# Patient Record
Sex: MALE | Race: WHITE | NOT HISPANIC OR LATINO | Employment: FULL TIME | URBAN - METROPOLITAN AREA
[De-identification: names, ages, dates, MRNs, and addresses within clinical notes are randomized per-mention and may not be internally consistent; named-entity substitution may affect disease eponyms.]

---

## 2019-07-31 ENCOUNTER — APPOINTMENT (OUTPATIENT)
Dept: LAB | Facility: CLINIC | Age: 54
End: 2019-07-31
Payer: COMMERCIAL

## 2019-07-31 ENCOUNTER — TRANSCRIBE ORDERS (OUTPATIENT)
Dept: LAB | Facility: CLINIC | Age: 54
End: 2019-07-31

## 2019-07-31 DIAGNOSIS — D64.9 ANEMIA, UNSPECIFIED TYPE: ICD-10-CM

## 2019-07-31 DIAGNOSIS — R73.01 IMPAIRED FASTING GLUCOSE: ICD-10-CM

## 2019-07-31 DIAGNOSIS — Z12.11 SPECIAL SCREENING FOR MALIGNANT NEOPLASMS, COLON: ICD-10-CM

## 2019-07-31 DIAGNOSIS — E03.9 MYXEDEMA HEART DISEASE: ICD-10-CM

## 2019-07-31 DIAGNOSIS — E78.5 HYPERLIPIDEMIA, UNSPECIFIED HYPERLIPIDEMIA TYPE: ICD-10-CM

## 2019-07-31 DIAGNOSIS — I51.9 MYXEDEMA HEART DISEASE: ICD-10-CM

## 2019-07-31 DIAGNOSIS — R97.20 ELEVATED PROSTATE SPECIFIC ANTIGEN (PSA): ICD-10-CM

## 2019-07-31 DIAGNOSIS — R73.01 IMPAIRED FASTING GLUCOSE: Primary | ICD-10-CM

## 2019-07-31 LAB
ALBUMIN SERPL BCP-MCNC: 3.7 G/DL (ref 3.5–5)
ALP SERPL-CCNC: 53 U/L (ref 46–116)
ALT SERPL W P-5'-P-CCNC: 31 U/L (ref 12–78)
ANION GAP SERPL CALCULATED.3IONS-SCNC: 5 MMOL/L (ref 4–13)
AST SERPL W P-5'-P-CCNC: 15 U/L (ref 5–45)
BASOPHILS # BLD AUTO: 0.05 THOUSANDS/ΜL (ref 0–0.1)
BASOPHILS NFR BLD AUTO: 1 % (ref 0–1)
BILIRUB SERPL-MCNC: 0.6 MG/DL (ref 0.2–1)
BUN SERPL-MCNC: 13 MG/DL (ref 5–25)
CALCIUM SERPL-MCNC: 8.5 MG/DL (ref 8.3–10.1)
CHLORIDE SERPL-SCNC: 105 MMOL/L (ref 100–108)
CHOLEST SERPL-MCNC: 206 MG/DL (ref 50–200)
CO2 SERPL-SCNC: 27 MMOL/L (ref 21–32)
CREAT SERPL-MCNC: 1.01 MG/DL (ref 0.6–1.3)
EOSINOPHIL # BLD AUTO: 0.48 THOUSAND/ΜL (ref 0–0.61)
EOSINOPHIL NFR BLD AUTO: 7 % (ref 0–6)
ERYTHROCYTE [DISTWIDTH] IN BLOOD BY AUTOMATED COUNT: 13.9 % (ref 11.6–15.1)
ERYTHROCYTE [SEDIMENTATION RATE] IN BLOOD: 5 MM/HOUR (ref 0–10)
GFR SERPL CREATININE-BSD FRML MDRD: 85 ML/MIN/1.73SQ M
GLUCOSE P FAST SERPL-MCNC: 95 MG/DL (ref 65–99)
HCT VFR BLD AUTO: 46.5 % (ref 36.5–49.3)
HDLC SERPL-MCNC: 40 MG/DL (ref 40–60)
HGB BLD-MCNC: 15 G/DL (ref 12–17)
IMM GRANULOCYTES # BLD AUTO: 0.01 THOUSAND/UL (ref 0–0.2)
IMM GRANULOCYTES NFR BLD AUTO: 0 % (ref 0–2)
LDLC SERPL CALC-MCNC: 139 MG/DL (ref 0–100)
LYMPHOCYTES # BLD AUTO: 1.78 THOUSANDS/ΜL (ref 0.6–4.47)
LYMPHOCYTES NFR BLD AUTO: 26 % (ref 14–44)
MCH RBC QN AUTO: 28.7 PG (ref 26.8–34.3)
MCHC RBC AUTO-ENTMCNC: 32.3 G/DL (ref 31.4–37.4)
MCV RBC AUTO: 89 FL (ref 82–98)
MONOCYTES # BLD AUTO: 0.57 THOUSAND/ΜL (ref 0.17–1.22)
MONOCYTES NFR BLD AUTO: 8 % (ref 4–12)
NEUTROPHILS # BLD AUTO: 3.91 THOUSANDS/ΜL (ref 1.85–7.62)
NEUTS SEG NFR BLD AUTO: 58 % (ref 43–75)
NONHDLC SERPL-MCNC: 166 MG/DL
NRBC BLD AUTO-RTO: 0 /100 WBCS
PLATELET # BLD AUTO: 288 THOUSANDS/UL (ref 149–390)
PMV BLD AUTO: 10.5 FL (ref 8.9–12.7)
POTASSIUM SERPL-SCNC: 4.2 MMOL/L (ref 3.5–5.3)
PROT SERPL-MCNC: 7.4 G/DL (ref 6.4–8.2)
RBC # BLD AUTO: 5.22 MILLION/UL (ref 3.88–5.62)
SODIUM SERPL-SCNC: 137 MMOL/L (ref 136–145)
TRIGL SERPL-MCNC: 133 MG/DL
TSH SERPL DL<=0.05 MIU/L-ACNC: 1.34 UIU/ML (ref 0.36–3.74)
URATE SERPL-MCNC: 5.8 MG/DL (ref 4.2–8)
WBC # BLD AUTO: 6.8 THOUSAND/UL (ref 4.31–10.16)

## 2019-07-31 PROCEDURE — 80053 COMPREHEN METABOLIC PANEL: CPT

## 2019-07-31 PROCEDURE — 84443 ASSAY THYROID STIM HORMONE: CPT

## 2019-07-31 PROCEDURE — 86430 RHEUMATOID FACTOR TEST QUAL: CPT

## 2019-07-31 PROCEDURE — 84154 ASSAY OF PSA FREE: CPT

## 2019-07-31 PROCEDURE — 84153 ASSAY OF PSA TOTAL: CPT

## 2019-07-31 PROCEDURE — 86618 LYME DISEASE ANTIBODY: CPT

## 2019-07-31 PROCEDURE — 86200 CCP ANTIBODY: CPT

## 2019-07-31 PROCEDURE — 86038 ANTINUCLEAR ANTIBODIES: CPT

## 2019-07-31 PROCEDURE — 80061 LIPID PANEL: CPT

## 2019-07-31 PROCEDURE — 85652 RBC SED RATE AUTOMATED: CPT

## 2019-07-31 PROCEDURE — 85025 COMPLETE CBC W/AUTO DIFF WBC: CPT

## 2019-07-31 PROCEDURE — 84550 ASSAY OF BLOOD/URIC ACID: CPT

## 2019-07-31 PROCEDURE — 36415 COLL VENOUS BLD VENIPUNCTURE: CPT

## 2019-08-01 LAB
PSA FREE MFR SERPL: 54.4 %
PSA FREE SERPL-MCNC: 0.49 NG/ML
PSA SERPL-MCNC: 0.9 NG/ML (ref 0–4)
RHEUMATOID FACT SER QL LA: NEGATIVE

## 2019-08-02 LAB
B BURGDOR IGG SER IA-ACNC: 0.15
B BURGDOR IGM SER IA-ACNC: 0.25
CCP IGA+IGG SERPL IA-ACNC: 3 UNITS (ref 0–19)
RYE IGE QN: NEGATIVE

## 2021-01-20 ENCOUNTER — TRANSCRIBE ORDERS (OUTPATIENT)
Dept: ADMINISTRATIVE | Facility: HOSPITAL | Age: 56
End: 2021-01-20

## 2021-01-20 DIAGNOSIS — I10 SYSTEMIC PRIMARY ARTERIAL HYPERTENSION: ICD-10-CM

## 2021-01-20 DIAGNOSIS — E55.9 VITAMIN D DEFICIENCY DISEASE: ICD-10-CM

## 2021-01-20 DIAGNOSIS — E78.5 HYPERLIPIDEMIA, UNSPECIFIED HYPERLIPIDEMIA TYPE: ICD-10-CM

## 2021-01-20 DIAGNOSIS — D64.9 ANEMIA, UNSPECIFIED TYPE: ICD-10-CM

## 2021-01-20 DIAGNOSIS — I15.9 SECONDARY HYPERTENSION: Primary | ICD-10-CM

## 2021-01-22 ENCOUNTER — LAB (OUTPATIENT)
Dept: LAB | Facility: HOSPITAL | Age: 56
End: 2021-01-22
Payer: COMMERCIAL

## 2021-01-22 DIAGNOSIS — E78.5 HYPERLIPIDEMIA, UNSPECIFIED HYPERLIPIDEMIA TYPE: ICD-10-CM

## 2021-01-22 DIAGNOSIS — E55.9 VITAMIN D DEFICIENCY DISEASE: ICD-10-CM

## 2021-01-22 DIAGNOSIS — I10 SYSTEMIC PRIMARY ARTERIAL HYPERTENSION: ICD-10-CM

## 2021-01-22 DIAGNOSIS — D64.9 ANEMIA, UNSPECIFIED TYPE: ICD-10-CM

## 2021-01-22 LAB
25(OH)D3 SERPL-MCNC: 18.1 NG/ML (ref 30–100)
ALBUMIN SERPL BCP-MCNC: 4.1 G/DL (ref 3.5–5)
ALP SERPL-CCNC: 61 U/L (ref 46–116)
ALT SERPL W P-5'-P-CCNC: 36 U/L (ref 12–78)
ANION GAP SERPL CALCULATED.3IONS-SCNC: 6 MMOL/L (ref 4–13)
AST SERPL W P-5'-P-CCNC: 26 U/L (ref 5–45)
BASOPHILS # BLD AUTO: 0.06 THOUSANDS/ΜL (ref 0–0.1)
BASOPHILS NFR BLD AUTO: 1 % (ref 0–1)
BILIRUB SERPL-MCNC: 0.6 MG/DL (ref 0.2–1)
BUN SERPL-MCNC: 13 MG/DL (ref 5–25)
CALCIUM SERPL-MCNC: 9.1 MG/DL (ref 8.3–10.1)
CHLORIDE SERPL-SCNC: 105 MMOL/L (ref 100–108)
CHOLEST SERPL-MCNC: 226 MG/DL (ref 50–200)
CO2 SERPL-SCNC: 28 MMOL/L (ref 21–32)
CREAT SERPL-MCNC: 0.91 MG/DL (ref 0.6–1.3)
EOSINOPHIL # BLD AUTO: 0.51 THOUSAND/ΜL (ref 0–0.61)
EOSINOPHIL NFR BLD AUTO: 7 % (ref 0–6)
ERYTHROCYTE [DISTWIDTH] IN BLOOD BY AUTOMATED COUNT: 13.1 % (ref 11.6–15.1)
GFR SERPL CREATININE-BSD FRML MDRD: 95 ML/MIN/1.73SQ M
GLUCOSE P FAST SERPL-MCNC: 93 MG/DL (ref 65–99)
HCT VFR BLD AUTO: 45 % (ref 36.5–49.3)
HDLC SERPL-MCNC: 48 MG/DL
HGB BLD-MCNC: 14.7 G/DL (ref 12–17)
IMM GRANULOCYTES # BLD AUTO: 0.03 THOUSAND/UL (ref 0–0.2)
IMM GRANULOCYTES NFR BLD AUTO: 0 % (ref 0–2)
LDLC SERPL CALC-MCNC: 159 MG/DL (ref 0–100)
LYMPHOCYTES # BLD AUTO: 1.84 THOUSANDS/ΜL (ref 0.6–4.47)
LYMPHOCYTES NFR BLD AUTO: 25 % (ref 14–44)
MCH RBC QN AUTO: 29.9 PG (ref 26.8–34.3)
MCHC RBC AUTO-ENTMCNC: 32.7 G/DL (ref 31.4–37.4)
MCV RBC AUTO: 92 FL (ref 82–98)
MONOCYTES # BLD AUTO: 0.92 THOUSAND/ΜL (ref 0.17–1.22)
MONOCYTES NFR BLD AUTO: 12 % (ref 4–12)
NEUTROPHILS # BLD AUTO: 4.12 THOUSANDS/ΜL (ref 1.85–7.62)
NEUTS SEG NFR BLD AUTO: 55 % (ref 43–75)
NONHDLC SERPL-MCNC: 178 MG/DL
NRBC BLD AUTO-RTO: 0 /100 WBCS
PLATELET # BLD AUTO: 394 THOUSANDS/UL (ref 149–390)
PMV BLD AUTO: 9.7 FL (ref 8.9–12.7)
POTASSIUM SERPL-SCNC: 4 MMOL/L (ref 3.5–5.3)
PROT SERPL-MCNC: 7.7 G/DL (ref 6.4–8.2)
RBC # BLD AUTO: 4.91 MILLION/UL (ref 3.88–5.62)
SODIUM SERPL-SCNC: 139 MMOL/L (ref 136–145)
TRIGL SERPL-MCNC: 95 MG/DL
WBC # BLD AUTO: 7.48 THOUSAND/UL (ref 4.31–10.16)

## 2021-01-22 PROCEDURE — 80061 LIPID PANEL: CPT

## 2021-01-22 PROCEDURE — 82306 VITAMIN D 25 HYDROXY: CPT

## 2021-01-22 PROCEDURE — 80053 COMPREHEN METABOLIC PANEL: CPT

## 2021-01-22 PROCEDURE — 36415 COLL VENOUS BLD VENIPUNCTURE: CPT

## 2021-01-22 PROCEDURE — 85025 COMPLETE CBC W/AUTO DIFF WBC: CPT

## 2021-12-21 ENCOUNTER — APPOINTMENT (OUTPATIENT)
Dept: LAB | Facility: CLINIC | Age: 56
End: 2021-12-21
Payer: COMMERCIAL

## 2021-12-21 DIAGNOSIS — E78.00 PURE HYPERCHOLESTEROLEMIA: ICD-10-CM

## 2021-12-21 DIAGNOSIS — D64.9 ANEMIA, UNSPECIFIED TYPE: ICD-10-CM

## 2021-12-21 DIAGNOSIS — N40.0 BENIGN PROSTATIC HYPERPLASIA WITHOUT LOWER URINARY TRACT SYMPTOMS: ICD-10-CM

## 2021-12-21 DIAGNOSIS — E53.9 VITAMIN B-COMPLEX DEFICIENCY: ICD-10-CM

## 2021-12-21 DIAGNOSIS — I10 ESSENTIAL HYPERTENSION, MALIGNANT: ICD-10-CM

## 2021-12-21 DIAGNOSIS — I51.9 MYXEDEMA HEART DISEASE: ICD-10-CM

## 2021-12-21 DIAGNOSIS — E55.9 AVITAMINOSIS D: ICD-10-CM

## 2021-12-21 DIAGNOSIS — E03.9 MYXEDEMA HEART DISEASE: ICD-10-CM

## 2021-12-21 LAB
25(OH)D3 SERPL-MCNC: 40.1 NG/ML (ref 30–100)
ALBUMIN SERPL BCP-MCNC: 4 G/DL (ref 3.5–5)
ALP SERPL-CCNC: 54 U/L (ref 46–116)
ALT SERPL W P-5'-P-CCNC: 32 U/L (ref 12–78)
ANION GAP SERPL CALCULATED.3IONS-SCNC: 7 MMOL/L (ref 4–13)
AST SERPL W P-5'-P-CCNC: 16 U/L (ref 5–45)
BASOPHILS # BLD AUTO: 0.06 THOUSANDS/ΜL (ref 0–0.1)
BASOPHILS NFR BLD AUTO: 1 % (ref 0–1)
BILIRUB SERPL-MCNC: 1.02 MG/DL (ref 0.2–1)
BUN SERPL-MCNC: 16 MG/DL (ref 5–25)
CALCIUM SERPL-MCNC: 9.9 MG/DL (ref 8.3–10.1)
CHLORIDE SERPL-SCNC: 103 MMOL/L (ref 100–108)
CHOLEST SERPL-MCNC: 198 MG/DL
CO2 SERPL-SCNC: 27 MMOL/L (ref 21–32)
CREAT SERPL-MCNC: 1.05 MG/DL (ref 0.6–1.3)
EOSINOPHIL # BLD AUTO: 0.53 THOUSAND/ΜL (ref 0–0.61)
EOSINOPHIL NFR BLD AUTO: 7 % (ref 0–6)
ERYTHROCYTE [DISTWIDTH] IN BLOOD BY AUTOMATED COUNT: 12.9 % (ref 11.6–15.1)
GFR SERPL CREATININE-BSD FRML MDRD: 78 ML/MIN/1.73SQ M
GLUCOSE P FAST SERPL-MCNC: 89 MG/DL (ref 65–99)
HCT VFR BLD AUTO: 46 % (ref 36.5–49.3)
HDLC SERPL-MCNC: 45 MG/DL
HGB BLD-MCNC: 14.9 G/DL (ref 12–17)
IMM GRANULOCYTES # BLD AUTO: 0.01 THOUSAND/UL (ref 0–0.2)
IMM GRANULOCYTES NFR BLD AUTO: 0 % (ref 0–2)
LDLC SERPL CALC-MCNC: 127 MG/DL (ref 0–100)
LYMPHOCYTES # BLD AUTO: 2.16 THOUSANDS/ΜL (ref 0.6–4.47)
LYMPHOCYTES NFR BLD AUTO: 29 % (ref 14–44)
MCH RBC QN AUTO: 29.2 PG (ref 26.8–34.3)
MCHC RBC AUTO-ENTMCNC: 32.4 G/DL (ref 31.4–37.4)
MCV RBC AUTO: 90 FL (ref 82–98)
MONOCYTES # BLD AUTO: 0.9 THOUSAND/ΜL (ref 0.17–1.22)
MONOCYTES NFR BLD AUTO: 12 % (ref 4–12)
NEUTROPHILS # BLD AUTO: 3.79 THOUSANDS/ΜL (ref 1.85–7.62)
NEUTS SEG NFR BLD AUTO: 51 % (ref 43–75)
NONHDLC SERPL-MCNC: 153 MG/DL
NRBC BLD AUTO-RTO: 0 /100 WBCS
PLATELET # BLD AUTO: 390 THOUSANDS/UL (ref 149–390)
PMV BLD AUTO: 10 FL (ref 8.9–12.7)
POTASSIUM SERPL-SCNC: 4 MMOL/L (ref 3.5–5.3)
PROT SERPL-MCNC: 7.6 G/DL (ref 6.4–8.2)
PSA SERPL-MCNC: 0.6 NG/ML (ref 0–4)
RBC # BLD AUTO: 5.1 MILLION/UL (ref 3.88–5.62)
SODIUM SERPL-SCNC: 137 MMOL/L (ref 136–145)
TRIGL SERPL-MCNC: 129 MG/DL
TSH SERPL DL<=0.05 MIU/L-ACNC: 2.7 UIU/ML (ref 0.36–3.74)
VIT B12 SERPL-MCNC: 1724 PG/ML (ref 100–900)
WBC # BLD AUTO: 7.45 THOUSAND/UL (ref 4.31–10.16)

## 2021-12-21 PROCEDURE — 82306 VITAMIN D 25 HYDROXY: CPT

## 2021-12-21 PROCEDURE — 80053 COMPREHEN METABOLIC PANEL: CPT

## 2021-12-21 PROCEDURE — 82607 VITAMIN B-12: CPT

## 2021-12-21 PROCEDURE — 80061 LIPID PANEL: CPT

## 2021-12-21 PROCEDURE — 84153 ASSAY OF PSA TOTAL: CPT

## 2021-12-21 PROCEDURE — 85025 COMPLETE CBC W/AUTO DIFF WBC: CPT

## 2021-12-21 PROCEDURE — 84443 ASSAY THYROID STIM HORMONE: CPT

## 2021-12-21 PROCEDURE — 36415 COLL VENOUS BLD VENIPUNCTURE: CPT

## 2022-08-18 ENCOUNTER — LAB REQUISITION (OUTPATIENT)
Dept: LAB | Facility: HOSPITAL | Age: 57
End: 2022-08-18
Payer: COMMERCIAL

## 2022-08-18 DIAGNOSIS — K63.5 POLYP OF COLON: ICD-10-CM

## 2022-08-18 DIAGNOSIS — Z12.11 ENCOUNTER FOR SCREENING FOR MALIGNANT NEOPLASM OF COLON: ICD-10-CM

## 2022-08-18 PROCEDURE — 88305 TISSUE EXAM BY PATHOLOGIST: CPT | Performed by: SPECIALIST

## 2022-08-18 PROCEDURE — 99285 EMERGENCY DEPT VISIT HI MDM: CPT

## 2022-08-19 ENCOUNTER — ANESTHESIA EVENT (INPATIENT)
Dept: GASTROENTEROLOGY | Facility: HOSPITAL | Age: 57
DRG: 378 | End: 2022-08-19
Payer: COMMERCIAL

## 2022-08-19 ENCOUNTER — APPOINTMENT (OUTPATIENT)
Dept: GASTROENTEROLOGY | Facility: HOSPITAL | Age: 57
DRG: 378 | End: 2022-08-19
Payer: COMMERCIAL

## 2022-08-19 ENCOUNTER — HOSPITAL ENCOUNTER (INPATIENT)
Facility: HOSPITAL | Age: 57
LOS: 1 days | Discharge: HOME | DRG: 378 | End: 2022-08-20
Attending: EMERGENCY MEDICINE | Admitting: INTERNAL MEDICINE
Payer: COMMERCIAL

## 2022-08-19 ENCOUNTER — ANESTHESIA (INPATIENT)
Dept: GASTROENTEROLOGY | Facility: HOSPITAL | Age: 57
DRG: 378 | End: 2022-08-19
Payer: COMMERCIAL

## 2022-08-19 DIAGNOSIS — K62.5 RECTAL BLEEDING: Primary | ICD-10-CM

## 2022-08-19 DIAGNOSIS — Z98.890 S/P COLONOSCOPY: ICD-10-CM

## 2022-08-19 DIAGNOSIS — K92.2 LOWER GI BLEED: ICD-10-CM

## 2022-08-19 PROBLEM — N17.9 AKI (ACUTE KIDNEY INJURY) (HCC): Status: ACTIVE | Noted: 2022-08-19

## 2022-08-19 PROBLEM — J30.2 SEASONAL ALLERGIES: Status: ACTIVE | Noted: 2022-08-19

## 2022-08-19 LAB
ABO GROUP BLD: NORMAL
ABO GROUP BLD: NORMAL
ALBUMIN SERPL BCP-MCNC: 3.2 G/DL (ref 3.5–5)
ALP SERPL-CCNC: 41 U/L (ref 46–116)
ALT SERPL W P-5'-P-CCNC: 25 U/L (ref 12–78)
ANION GAP SERPL CALCULATED.3IONS-SCNC: 4 MMOL/L (ref 4–13)
ANION GAP SERPL CALCULATED.3IONS-SCNC: 4 MMOL/L (ref 4–13)
APTT PPP: 23 SECONDS (ref 23–37)
AST SERPL W P-5'-P-CCNC: 17 U/L (ref 5–45)
ATRIAL RATE: 66 BPM
BASOPHILS # BLD AUTO: 0.02 THOUSANDS/ΜL (ref 0–0.1)
BASOPHILS # BLD AUTO: 0.04 THOUSANDS/ΜL (ref 0–0.1)
BASOPHILS NFR BLD AUTO: 0 % (ref 0–1)
BASOPHILS NFR BLD AUTO: 0 % (ref 0–1)
BILIRUB SERPL-MCNC: 0.2 MG/DL (ref 0.2–1)
BLD GP AB SCN SERPL QL: NEGATIVE
BUN SERPL-MCNC: 18 MG/DL (ref 5–25)
BUN SERPL-MCNC: 19 MG/DL (ref 5–25)
CALCIUM ALBUM COR SERPL-MCNC: 9.4 MG/DL (ref 8.3–10.1)
CALCIUM SERPL-MCNC: 7.7 MG/DL (ref 8.3–10.1)
CALCIUM SERPL-MCNC: 8.8 MG/DL (ref 8.3–10.1)
CHLORIDE SERPL-SCNC: 108 MMOL/L (ref 96–108)
CHLORIDE SERPL-SCNC: 110 MMOL/L (ref 96–108)
CO2 SERPL-SCNC: 24 MMOL/L (ref 21–32)
CO2 SERPL-SCNC: 25 MMOL/L (ref 21–32)
CREAT SERPL-MCNC: 1.06 MG/DL (ref 0.6–1.3)
CREAT SERPL-MCNC: 1.52 MG/DL (ref 0.6–1.3)
EOSINOPHIL # BLD AUTO: 0.43 THOUSAND/ΜL (ref 0–0.61)
EOSINOPHIL # BLD AUTO: 0.69 THOUSAND/ΜL (ref 0–0.61)
EOSINOPHIL NFR BLD AUTO: 5 % (ref 0–6)
EOSINOPHIL NFR BLD AUTO: 5 % (ref 0–6)
ERYTHROCYTE [DISTWIDTH] IN BLOOD BY AUTOMATED COUNT: 13.5 % (ref 11.6–15.1)
ERYTHROCYTE [DISTWIDTH] IN BLOOD BY AUTOMATED COUNT: 13.8 % (ref 11.6–15.1)
FIBRINOGEN PPP-MCNC: 237 MG/DL (ref 227–495)
GFR SERPL CREATININE-BSD FRML MDRD: 50 ML/MIN/1.73SQ M
GFR SERPL CREATININE-BSD FRML MDRD: 78 ML/MIN/1.73SQ M
GLUCOSE SERPL-MCNC: 117 MG/DL (ref 65–140)
GLUCOSE SERPL-MCNC: 143 MG/DL (ref 65–140)
HCT VFR BLD AUTO: 31.6 % (ref 36.5–49.3)
HCT VFR BLD AUTO: 36.7 % (ref 36.5–49.3)
HGB BLD-MCNC: 10.2 G/DL (ref 12–17)
HGB BLD-MCNC: 10.4 G/DL (ref 12–17)
HGB BLD-MCNC: 10.6 G/DL (ref 12–17)
HGB BLD-MCNC: 12.1 G/DL (ref 12–17)
IMM GRANULOCYTES # BLD AUTO: 0.05 THOUSAND/UL (ref 0–0.2)
IMM GRANULOCYTES # BLD AUTO: 0.05 THOUSAND/UL (ref 0–0.2)
IMM GRANULOCYTES NFR BLD AUTO: 0 % (ref 0–2)
IMM GRANULOCYTES NFR BLD AUTO: 1 % (ref 0–2)
INR PPP: 0.97 (ref 0.84–1.19)
LYMPHOCYTES # BLD AUTO: 1.79 THOUSANDS/ΜL (ref 0.6–4.47)
LYMPHOCYTES # BLD AUTO: 3.3 THOUSANDS/ΜL (ref 0.6–4.47)
LYMPHOCYTES NFR BLD AUTO: 20 % (ref 14–44)
LYMPHOCYTES NFR BLD AUTO: 23 % (ref 14–44)
MAGNESIUM SERPL-MCNC: 2.2 MG/DL (ref 1.6–2.6)
MCH RBC QN AUTO: 29.5 PG (ref 26.8–34.3)
MCH RBC QN AUTO: 29.7 PG (ref 26.8–34.3)
MCHC RBC AUTO-ENTMCNC: 32.9 G/DL (ref 31.4–37.4)
MCHC RBC AUTO-ENTMCNC: 33 G/DL (ref 31.4–37.4)
MCV RBC AUTO: 90 FL (ref 82–98)
MCV RBC AUTO: 90 FL (ref 82–98)
MONOCYTES # BLD AUTO: 0.86 THOUSAND/ΜL (ref 0.17–1.22)
MONOCYTES # BLD AUTO: 1.55 THOUSAND/ΜL (ref 0.17–1.22)
MONOCYTES NFR BLD AUTO: 10 % (ref 4–12)
MONOCYTES NFR BLD AUTO: 11 % (ref 4–12)
NEUTROPHILS # BLD AUTO: 5.92 THOUSANDS/ΜL (ref 1.85–7.62)
NEUTROPHILS # BLD AUTO: 8.64 THOUSANDS/ΜL (ref 1.85–7.62)
NEUTS SEG NFR BLD AUTO: 61 % (ref 43–75)
NEUTS SEG NFR BLD AUTO: 64 % (ref 43–75)
NRBC BLD AUTO-RTO: 0 /100 WBCS
NRBC BLD AUTO-RTO: 0 /100 WBCS
P AXIS: 39 DEGREES
PLATELET # BLD AUTO: 276 THOUSANDS/UL (ref 149–390)
PLATELET # BLD AUTO: 381 THOUSANDS/UL (ref 149–390)
PMV BLD AUTO: 9.4 FL (ref 8.9–12.7)
PMV BLD AUTO: 9.6 FL (ref 8.9–12.7)
POTASSIUM SERPL-SCNC: 3.7 MMOL/L (ref 3.5–5.3)
POTASSIUM SERPL-SCNC: 4.2 MMOL/L (ref 3.5–5.3)
PR INTERVAL: 126 MS
PROT SERPL-MCNC: 6.4 G/DL (ref 6.4–8.4)
PROTHROMBIN TIME: 13.1 SECONDS (ref 11.6–14.5)
QRS AXIS: 13 DEGREES
QRSD INTERVAL: 94 MS
QT INTERVAL: 398 MS
QTC INTERVAL: 417 MS
RBC # BLD AUTO: 3.52 MILLION/UL (ref 3.88–5.62)
RBC # BLD AUTO: 4.07 MILLION/UL (ref 3.88–5.62)
RH BLD: POSITIVE
RH BLD: POSITIVE
SODIUM SERPL-SCNC: 137 MMOL/L (ref 135–147)
SODIUM SERPL-SCNC: 138 MMOL/L (ref 135–147)
SPECIMEN EXPIRATION DATE: NORMAL
T WAVE AXIS: 0 DEGREES
THROMBIN TIME: 14.8 SECONDS (ref 14.7–18.4)
VENTRICULAR RATE: 66 BPM
WBC # BLD AUTO: 14.27 THOUSAND/UL (ref 4.31–10.16)
WBC # BLD AUTO: 9.07 THOUSAND/UL (ref 4.31–10.16)

## 2022-08-19 PROCEDURE — 88305 TISSUE EXAM BY PATHOLOGIST: CPT | Performed by: STUDENT IN AN ORGANIZED HEALTH CARE EDUCATION/TRAINING PROGRAM

## 2022-08-19 PROCEDURE — 86850 RBC ANTIBODY SCREEN: CPT | Performed by: EMERGENCY MEDICINE

## 2022-08-19 PROCEDURE — 99223 1ST HOSP IP/OBS HIGH 75: CPT | Performed by: INTERNAL MEDICINE

## 2022-08-19 PROCEDURE — 93005 ELECTROCARDIOGRAM TRACING: CPT

## 2022-08-19 PROCEDURE — 85670 THROMBIN TIME PLASMA: CPT | Performed by: INTERNAL MEDICINE

## 2022-08-19 PROCEDURE — 85730 THROMBOPLASTIN TIME PARTIAL: CPT | Performed by: INTERNAL MEDICINE

## 2022-08-19 PROCEDURE — 99232 SBSQ HOSP IP/OBS MODERATE 35: CPT | Performed by: PHYSICIAN ASSISTANT

## 2022-08-19 PROCEDURE — 93010 ELECTROCARDIOGRAM REPORT: CPT | Performed by: INTERNAL MEDICINE

## 2022-08-19 PROCEDURE — 0DBL8ZZ EXCISION OF TRANSVERSE COLON, VIA NATURAL OR ARTIFICIAL OPENING ENDOSCOPIC: ICD-10-PCS | Performed by: INTERNAL MEDICINE

## 2022-08-19 PROCEDURE — 85018 HEMOGLOBIN: CPT | Performed by: INTERNAL MEDICINE

## 2022-08-19 PROCEDURE — 80053 COMPREHEN METABOLIC PANEL: CPT | Performed by: EMERGENCY MEDICINE

## 2022-08-19 PROCEDURE — 83735 ASSAY OF MAGNESIUM: CPT | Performed by: EMERGENCY MEDICINE

## 2022-08-19 PROCEDURE — 36415 COLL VENOUS BLD VENIPUNCTURE: CPT | Performed by: EMERGENCY MEDICINE

## 2022-08-19 PROCEDURE — 99285 EMERGENCY DEPT VISIT HI MDM: CPT | Performed by: EMERGENCY MEDICINE

## 2022-08-19 PROCEDURE — 85384 FIBRINOGEN ACTIVITY: CPT | Performed by: INTERNAL MEDICINE

## 2022-08-19 PROCEDURE — 86900 BLOOD TYPING SEROLOGIC ABO: CPT | Performed by: EMERGENCY MEDICINE

## 2022-08-19 PROCEDURE — 85610 PROTHROMBIN TIME: CPT | Performed by: INTERNAL MEDICINE

## 2022-08-19 PROCEDURE — 86901 BLOOD TYPING SEROLOGIC RH(D): CPT | Performed by: EMERGENCY MEDICINE

## 2022-08-19 PROCEDURE — 45381 COLONOSCOPY SUBMUCOUS NJX: CPT | Performed by: INTERNAL MEDICINE

## 2022-08-19 PROCEDURE — 85025 COMPLETE CBC W/AUTO DIFF WBC: CPT | Performed by: EMERGENCY MEDICINE

## 2022-08-19 PROCEDURE — 45385 COLONOSCOPY W/LESION REMOVAL: CPT | Performed by: INTERNAL MEDICINE

## 2022-08-19 PROCEDURE — 99232 SBSQ HOSP IP/OBS MODERATE 35: CPT | Performed by: INTERNAL MEDICINE

## 2022-08-19 PROCEDURE — 80048 BASIC METABOLIC PNL TOTAL CA: CPT | Performed by: INTERNAL MEDICINE

## 2022-08-19 PROCEDURE — 85025 COMPLETE CBC W/AUTO DIFF WBC: CPT | Performed by: INTERNAL MEDICINE

## 2022-08-19 RX ORDER — LIDOCAINE HYDROCHLORIDE 10 MG/ML
INJECTION, SOLUTION EPIDURAL; INFILTRATION; INTRACAUDAL; PERINEURAL AS NEEDED
Status: DISCONTINUED | OUTPATIENT
Start: 2022-08-19 | End: 2022-08-19

## 2022-08-19 RX ORDER — PROPOFOL 10 MG/ML
INJECTION, EMULSION INTRAVENOUS CONTINUOUS PRN
Status: DISCONTINUED | OUTPATIENT
Start: 2022-08-19 | End: 2022-08-19

## 2022-08-19 RX ORDER — SODIUM CHLORIDE 9 MG/ML
INJECTION, SOLUTION INTRAVENOUS CONTINUOUS PRN
Status: DISCONTINUED | OUTPATIENT
Start: 2022-08-19 | End: 2022-08-19

## 2022-08-19 RX ORDER — ONDANSETRON 2 MG/ML
4 INJECTION INTRAMUSCULAR; INTRAVENOUS EVERY 6 HOURS PRN
Status: DISCONTINUED | OUTPATIENT
Start: 2022-08-19 | End: 2022-08-20 | Stop reason: HOSPADM

## 2022-08-19 RX ORDER — PROPOFOL 10 MG/ML
INJECTION, EMULSION INTRAVENOUS AS NEEDED
Status: DISCONTINUED | OUTPATIENT
Start: 2022-08-19 | End: 2022-08-19

## 2022-08-19 RX ORDER — ACETAMINOPHEN 325 MG/1
650 TABLET ORAL EVERY 6 HOURS PRN
Status: DISCONTINUED | OUTPATIENT
Start: 2022-08-19 | End: 2022-08-20 | Stop reason: HOSPADM

## 2022-08-19 RX ADMIN — LIDOCAINE HYDROCHLORIDE 2 ML: 10 INJECTION, SOLUTION EPIDURAL; INFILTRATION; INTRACAUDAL; PERINEURAL at 13:50

## 2022-08-19 RX ADMIN — PROPOFOL 130 MCG/KG/MIN: 10 INJECTION, EMULSION INTRAVENOUS at 13:50

## 2022-08-19 RX ADMIN — SODIUM CHLORIDE 1000 ML: 0.9 INJECTION, SOLUTION INTRAVENOUS at 01:04

## 2022-08-19 RX ADMIN — PROPOFOL 150 MG: 10 INJECTION, EMULSION INTRAVENOUS at 13:50

## 2022-08-19 RX ADMIN — SODIUM CHLORIDE: 9 INJECTION, SOLUTION INTRAVENOUS at 13:46

## 2022-08-19 NOTE — ASSESSMENT & PLAN NOTE
· Present on admission creatinine 1 52   · Prior baseline appears to be closer to 1   · Receive 1 L of IV fluids in the emergency department with resolution of DILIA  · Likely in the setting of hypovolemia    · Continue to monitor creatinine

## 2022-08-19 NOTE — PROGRESS NOTES
[facility]  Progress Note - Immanuel Blum 1965, 64 y o  male MRN: [id number]  Unit/Bed#: ED 22 Encounter: [id number]  Primary Care Provider: Marjan Caba MD   Date and time admitted to hospital: 8/19/2022 12:15 AM    * Lower GI bleeding  Assessment & Plan  · Patient with recent colonoscopy with Dr Steph Ma to remove 8 mm x 10 mm polyp  Developed significant rectal bleeding post colonoscopy in presented to the emergency department  · Blood pressure is noted to be soft, however patient states that this is normal for him  · He is otherwise stable at this time  · Hemoglobin noted to drop from baseline of around 14 to 10 4 today  · Plan for NPO and flex sigmoidoscopy with GI later today  · Appreciate GI recommendations  · Continue to trend H&H  DILIA (acute kidney injury) ([facility] [address] )  Assessment & Plan  · Present on admission creatinine 1 52   · Prior baseline appears to be closer to 1   · Receive 1 L of IV fluids in the emergency department with resolution of DILIA  · Likely in the setting of hypovolemia  · Continue to monitor creatinine    Status post colonoscopy with polypectomy  Assessment & Plan  · S/p colonoscopy w/ 1 polypectomy according to notes from [facility], had polyp of approximate 8 to 10 mm in size  Seasonal allergies  Assessment & Plan  · Patient on occasional use of Claritin, also occasionally takes pseudoephedrine  VTE Pharmacologic Prophylaxis:   Moderate Risk (Score 3-4) - Pharmacological DVT Prophylaxis Contraindicated  Sequential Compression Devices Ordered  Patient Centered Rounds: I performed bedside rounds with nursing staff today  Discussions with Specialists or Other Care Team Provider: GI, CM     Education and Discussions with Family / Patient: Updated  (wife) at bedside  Time Spent for Care: 30 minutes   More than 50% of total time spent on counseling and coordination of care as described above     Current Length of Stay: 0 day(s)  Current Patient Status: Inpatient   Certification Statement: The patient will continue to require additional inpatient hospital stay due to pending flex sigmoidoscopy with GI later today  Discharge Plan: Anticipate discharge in 24-48 hrs to home  Code Status: Level 1 - Full Code    Subjective:   Patient states that his rectal bleeding has decreased  Describes bright red blood per rectum for multiple bowel movements post colonoscopy  Denies any lightheadedness or dizziness at this time  Noted to be mildly hypotensive, however patient states that blood pressures usually run relatively low  Objective:     Vitals:   Temp (24hrs), Av 4 °F (36 3 °C), Min:97 4 °F (36 3 °C), Max:97 4 °F (36 3 °C)    Temp:  [97 4 °F (36 3 °C)] 97 4 °F (36 3 °C)  HR:  [61-74] 64  Resp:  [12-20] 20  BP: ()/(50-72) 119/72  SpO2:  [94 %-97 %] 97 %  There is no height or weight on file to calculate BMI  Input and Output Summary (last 24 hours):   No intake or output data in the 24 hours ending 22 1115    Physical Exam:   Physical Exam  Vitals and nursing note reviewed  Constitutional:       General: He is not in acute distress  HENT:      Head: Normocephalic and atraumatic  Mouth/Throat:      Mouth: Mucous membranes are moist       Pharynx: Oropharynx is clear  Eyes:      General:         Right eye: No discharge  Left eye: No discharge  Cardiovascular:      Rate and Rhythm: Normal rate and regular rhythm  Heart sounds: Normal heart sounds  Pulmonary:      Effort: Pulmonary effort is normal       Breath sounds: Normal breath sounds  No rales  Abdominal:      General: Abdomen is flat  Palpations: Abdomen is soft  Musculoskeletal:      Cervical back: No muscular tenderness  Right lower leg: No edema  Left lower leg: No edema  Skin:     General: Skin is warm and dry  Coloration: Skin is not jaundiced     Neurological: Mental Status: He is alert and oriented to person, place, and time  Mental status is at baseline  Psychiatric:         Mood and Affect: Mood normal           Additional Data:     Labs:  Results from last 7 days   Lab Units 08/19/22  0842   WBC Thousand/uL 9 07   HEMOGLOBIN g/dL 10 4*  10 6*   HEMATOCRIT % 31 6*   PLATELETS Thousands/uL 276   NEUTROS PCT % 64   LYMPHS PCT % 20   MONOS PCT % 10   EOS PCT % 5     Results from last 7 days   Lab Units 08/19/22  0842 08/19/22  0110   SODIUM mmol/L 138 137   POTASSIUM mmol/L 4 2 3 7   CHLORIDE mmol/L 110* 108   CO2 mmol/L 24 25   BUN mg/dL 18 19   CREATININE mg/dL 1 06 1 52*   ANION GAP mmol/L 4 4   CALCIUM mg/dL 7 7* 8 8   ALBUMIN g/dL  --  3 2*   TOTAL BILIRUBIN mg/dL  --  0 20   ALK PHOS U/L  --  41*   ALT U/L  --  25   AST U/L  --  17   GLUCOSE RANDOM mg/dL 117 143*     Results from last 7 days   Lab Units 08/19/22  0116   INR  0 97                   Lines/Drains:  Invasive Devices  Report    Peripheral Intravenous Line  Duration           Peripheral IV 08/19/22 Left;Dorsal (posterior) Forearm <1 day    Peripheral IV 08/19/22 Right Antecubital <1 day    Peripheral IV 08/19/22 Right Antecubital <1 day                      Imaging: No pertinent imaging reviewed  Recent Cultures (last 7 days):         Last 24 Hours Medication List:   Current Facility-Administered Medications   Medication Dose Route Frequency Provider Last Rate    acetaminophen  650 mg Oral Q6H PRN Hermila Tejeda MD      ondansetron  4 mg Intravenous Q6H PRN Hermila Tejeda MD          Today, Patient Was Seen By: Modesto Manning PA-C    **Please Note: This note may have been constructed using a voice recognition system  **

## 2022-08-19 NOTE — ANESTHESIA PREPROCEDURE EVALUATION
Procedure:  FLEXIBLE SIGMOIDOSCOPY   Pt had a colonoscopy followed by lower GIB  States he had a polyp that was removed  Toilet was filled with blood    NPO since 7pm 8/18      Relevant Problems   GI/HEPATIC   (+) Lower GI bleeding      /RENAL          Lab Results   Component Value Date    WBC 9 07 08/19/2022    HGB 10 6 (L) 08/19/2022    HGB 10 4 (L) 08/19/2022    HCT 31 6 (L) 08/19/2022    MCV 90 08/19/2022     08/19/2022       Physical Exam    Airway    Mallampati score: II  TM Distance: <3 FB  Neck ROM: full     Dental   No notable dental hx     Cardiovascular  Rhythm: regular, Rate: normal,     Pulmonary  Breath sounds clear to auscultation,     Other Findings        Anesthesia Plan  ASA Score- 2 Emergent    Anesthesia Type-     Plan Factors-Exercise tolerance (METS): >4 METS  Chart reviewed  Existing labs reviewed  Patient summary reviewed  Patient is not a current smoker  Induction- intravenous  Postoperative Plan-     Informed Consent- Anesthetic plan and risks discussed with patient  I personally reviewed this patient with the CRNA  Discussed and agreed on the Anesthesia Plan with the CRNA  Pebbles Cook

## 2022-08-19 NOTE — H&P
[address] [facility]  H&P- Audrey Mejia 1965, 64 y o  male MRN: [id number]  Unit/Bed#: ED 22 Encounter: [id number]  Primary Care Provider: Gautam Puentes MD   Date and time admitted to hospital: 8/19/2022 12:15 AM    * Lower GI bleeding  Assessment & Plan  Patient states that he had recent blood work done in an outside [employer] but no such record exists  Last hemoglobin in system was 14 4 in November of 2012  Patient was lightheaded; questionable hypotension but probably secondary to large BP cuff used  Patient is being given sodium chloride 0 9% 1 L x1  Orthostatic blood pressure  Maintain NPO; possibility of re scope  Need clipping? Hemoglobin every 8 hours  Blood transfusion consent in chart  Coagulation profile  GI consult (Marlee) for further opinion and possible repeat colonoscopy  Status post colonoscopy with polypectomy  Assessment & Plan  Patient status post colonoscopy with status post 1 polypectomy according to notes from [facility], had polyp of approximate 8 to 10 mm in size  Seasonal allergies  Assessment & Plan  Patient on occasional use of Claritin, also occasionally takes pseudoephedrine  VTE Prophylaxis: Pharmacologic VTE Prophylaxis contraindicated due to GIB  / sequential compression device   Code Status: Level 1 - Full Code as discussed   POLST: There is no POLST form on file for this patient (pre-hospital)    Anticipated Length of Stay:  Patient will be admitted on an Inpatient basis with an anticipated length of stay of  Greater than 2 midnights  Justification for Hospital Stay: Please see detailed plans noted above  Chief Complaint:     Red blood stool  History of Present Illness:  Audrey Mejia is a 64 y o  male who has past medical history significant for seasonal allergies denying any other systemic or chronic disease    Patient states that he takes an occasional allergy pill and for pain, occasional aspirin (not ibuprofen) and last ASA taken was Tuesday  He had a screening colonoscopy in which a polyp was taken and the mucosa was cauterized to prevent bleeding  He was taking Miralax to clean him out prior to procedure  After colonoscopy, he felt that his abdomen was "gurgly" but without any tenderness or distinct pain  Tonight before coming to the emergency room, he had 2 episodes of bloody stool which is liquid non foul smelling  No fever  No other CP or back pain  However, he did feel lightheaded  The BP cough initially was in the late eighties but it may be due to a large BP cuff  Subsequent BP readings in the early 100's  He does not have any history or family history of bleeding disorders  He drinks two beers daily  Currently, patient is lying flat on bed and is comfortable  Patient denies any shortness of breath  No lightheadedness as of the moment  Not in any distress    Review of Systems:    Constitutional:  Denies fever or chills   Eyes:  Denies change in visual acuity   HENT:  Denies nasal congestion or sore throat   Respiratory:  Denies cough or shortness of breath   Cardiovascular:  Denies chest pain or edema   GI:  Denies abdominal pain, nausea, vomiting, but has poor appetite, gurgly abdomen, bloody stools or diarrhea   :  Denies dysuria   Musculoskeletal:  Denies back pain or joint pain   Integument:  Denies rash   Neurologic:  Denies headache, focal weakness or sensory changes   Endocrine:  Denies polyuria or polydipsia   Lymphatic:  Denies swollen glands   Psychiatric:  Denies depression or anxiety     Past Medical and Surgical History:   No past medical history on file  No past surgical history on file  Patient denies any previous systemic medical history for chronic medical history    Meds/Allergies:  (Not in a hospital admission)      Allergies: No Known Allergies  History:  Marital Status:    Occupation:  Patient works in information technology  Patient Pre-hospital Living Situation:  Lives at home with girlfriend  Patient Pre-hospital Level of Mobility:  Ambulatory  Patient Pre-hospital Diet Restrictions:  Regular  Substance Use History:   Social History     Substance and Sexual Activity   Alcohol Use Not on file     Social History     Tobacco Use   Smoking Status Not on file   Smokeless Tobacco Not on file     Social History     Substance and Sexual Activity   Drug Use Not on file       Family History:  No family history on file  Physical Exam:     Vitals:   Blood Pressure: 103/60 (08/19/22 0020)  Pulse: 73 (08/19/22 0020)  Temperature: (!) 97 4 °F (36 3 °C) (08/19/22 0017)  Temp Source: Oral (08/19/22 0017)  Respirations: 18 (08/19/22 0017)  SpO2: 97 % (08/19/22 0020)    Constitutional:  Well developed, well nourished, no acute distress, non-toxic appearance   Eyes:  PERRL, conjunctiva normal   HENT:  Atraumatic, external ears normal, nose normal, oropharynx moist, no pharyngeal exudates  Neck- normal range of motion, no tenderness, supple   Respiratory:  No respiratory distress, normal breath sounds, no rales, no wheezing   Cardiovascular:  Normal rate, normal rhythm, no murmurs, no gallops, no rubs   GI:  Soft, nondistended, hyperactive gurgly bowel sounds, nontender, no organomegaly, no mass, no rebound, no guarding   :  No costovertebral angle tenderness   Musculoskeletal:  No edema, no tenderness, no deformities  Back- no tenderness  Integument:  Well hydrated, no rash   Lymphatic:  No lymphadenopathy noted   Neurologic:  Alert &awake, communicative, CN 2-12 normal, normal motor function, normal sensory function, no focal deficits noted   Psychiatric:  Speech and behavior appropriate       Lab Results: I have personally reviewed pertinent reports         Latest Reference Range & Units 08/19/22 01:10   WBC 4 31 - 10 16 Thousand/uL 14 27 (H)   Red Blood Cell Count 3 88 - 5 62 Million/uL 4 07   Hemoglobin 12 0 - 17 0 g/dL 12 1   HCT 36 5 - 49 3 % 36 7 MCV 82 - 98 fL 90   MCH 26 8 - 34 3 pg 29 7   MCHC 31 4 - 37 4 g/dL 33 0   RDW 11 6 - 15 1 % 13 5   Platelet Count 634 - 390 Thousands/uL 381   MPV 8 9 - 12 7 fL 9 6   nRBC /100 WBCs 0   Neutrophils % 43 - 75 % 61   Immat GRANS % 0 - 2 % 0   Lymphocytes Relative 14 - 44 % 23   Monocytes Relative 4 - 12 % 11   Eosinophils 0 - 6 % 5   Basophils Relative 0 - 1 % 0   Immature Grans Absolute 0 00 - 0 20 Thousand/uL 0 05   Absolute Neutrophils 1 85 - 7 62 Thousands/µL 8 64 (H)   Lymphocytes Absolute 0 60 - 4 47 Thousands/µL 3 30   Absolute Monocytes 0 17 - 1 22 Thousand/µL 1 55 (H)   Absolute Eosinophils 0 00 - 0 61 Thousand/µL 0 69 (H)   Basophils Absolute 0 00 - 0 10 Thousands/µL 0 04   (H): Data is abnormally high   Latest Reference Range & Units 08/19/22 01:10   Sodium 135 - 147 mmol/L 137   Potassium 3 5 - 5 3 mmol/L 3 7   Chloride 96 - 108 mmol/L 108   CO2 21 - 32 mmol/L 25   Anion Gap 4 - 13 mmol/L 4   BUN 5 - 25 mg/dL 19   Creatinine 0 60 - 1 30 mg/dL 1 52 (H)   Glucose, Random 65 - 140 mg/dL 143 (H)   Calcium 8 3 - 10 1 mg/dL 8 8   CORRECTED CALCIUM 8 3 - 10 1 mg/dL 9 4   AST 5 - 45 U/L 17   ALT 12 - 78 U/L 25   Alkaline Phosphatase 46 - 116 U/L 41 (L)   Total Protein 6 4 - 8 4 g/dL 6 4   Albumin 3 5 - 5 0 g/dL 3 2 (L)   TOTAL BILIRUBIN 0 20 - 1 00 mg/dL 0 20   eGFR ml/min/1 73sq m 50   (H): Data is abnormally high  (L): Data is abnormally low  Imaging: I have personally reviewed pertinent reports  No results found  ** Please Note: Dragon 360 Dictation voice to text software was used in the creation of this document   **

## 2022-08-19 NOTE — ASSESSMENT & PLAN NOTE
Patient states that he had recent blood work done in an outside [employer] but no such record exists  Last hemoglobin in system was 14 4 in November of 2012  Patient was lightheaded; questionable hypotension but probably secondary to large BP cuff used  Patient is being given sodium chloride 0 9% 1 L x1  Orthostatic blood pressure  Maintain NPO; possibility of re scope  Need clipping? Hemoglobin every 8 hours  Blood transfusion consent in chart  Coagulation profile  GI consult (Marlee) for further opinion and possible repeat colonoscopy

## 2022-08-19 NOTE — ED ATTENDING ATTESTATION
8/18/2022  IEdwar MD, saw and evaluated the patient  I have discussed the patient with the resident/non-physician practitioner and agree with the resident's/non-physician practitioner's findings, Plan of Care, and MDM as documented in the resident's/non-physician practitioner's note, except where noted  All available labs and Radiology studies were reviewed  I was present for key portions of any procedure(s) performed by the resident/non-physician practitioner and I was immediately available to provide assistance  At this point I agree with the current assessment done in the Emergency Department  I have conducted an independent evaluation of this patient a history and physical is as follows:  Patient here with rectal bleeding  The patient is a 69-year-old male who underwent screening colonoscopy this morning, and has since been having rectal bleeding  Patient has been feeling lightheaded and short of breath with this  He has had numerous episodes of bright red blood per rectum  No abdominal pain  No vomiting, but did feel nauseated  Patient is not on blood thinners  His review of systems otherwise -12 systems reviewed  On exam the patient is awake, alert, interactive  He is slightly pale  He is not tachycardic  He has had several episodes of hypotension here  HEENT exam is otherwise unremarkable  Neck is supple nontender with no adenopathy  Heart is regular without murmurs, rubs, gallops his lungs are clear bilaterally with good air movement  Abdomen is soft and nontender  He does not have any rebound or guarding  Extremities are intact  Neurologically nonfocal with normal skin and back exam   Impression:  GI bleeding, IV access established    Will plan to establish a 2nd IV, type and screen, admit to the hospital for GI bleeding, likely require 2nd scope given ongoing bleeding and low blood pressure  ED Course         Critical Care Time  Procedures

## 2022-08-19 NOTE — ASSESSMENT & PLAN NOTE
Patient status post colonoscopy with status post 1 polypectomy according to notes from [facility], had polyp of approximate 8 to 10 mm in size

## 2022-08-19 NOTE — ED NOTES
Pt states that he would prefer not to have tap water enema at this time he wants to go to the bathroom and see what it looks like first       Efraín Nelson RN  08/19/22 [id number]

## 2022-08-19 NOTE — ED PROVIDER NOTES
History  Chief Complaint   Patient presents with    Rectal Bleeding     Pt had a colonoscopy yesterday around 0900, has been bleeding rectally ever since, pt states bleeding and going to the bathroom has gotten worse since after dinner and pt feels "he loses a pint of bright red blood every time he goes to the bathroom"     65 y/o male with no significant past medical history presents to the ED for evaluation of multiple episodes of bright red blood per rectum today, s/p colonoscopy earlier this morning  The patient states that he had a routine colonoscopy at 0900, after which the patient was discharged and he and his wife ate lunch before returning home  After arriving home, the patient experienced an episode of bowel urgency, followed by bright red blood per rectum noted in the toilet bowl with minimal stool output  The patient states that he has had several similar episodes since then, most recently in the ER waiting room, where he also experienced an episode of lightheadedness  He denies fever, chills, cough, shortness of breath, chest pain, abdominal pain, nausea, vomiting, diarrhea, and urinary symptoms  His colonoscopy report showed a single polyp that was removed with polypectomy and cautery  He contacted his gastroenterologist and was advised to come to the ER for evaluation, as he may need repeat colonoscopy and clip placement  None       History reviewed  No pertinent past medical history  History reviewed  No pertinent surgical history  History reviewed  No pertinent family history  I have reviewed and agree with the history as documented      E-Cigarette/Vaping    E-Cigarette Use Never User      E-Cigarette/Vaping Substances     Social History     Tobacco Use    Smoking status: Former Smoker     Packs/day: 0 25     Years: 2 00     Pack years: 0 50     Quit date:      Years since quittin 6    Smokeless tobacco: Never Used   Vaping Use    Vaping Use: Never used Substance Use Topics    Alcohol use: Not Currently     Comment: social    Drug use: Never        Review of Systems   Constitutional: Negative for chills and fever  HENT: Negative for congestion, rhinorrhea and sore throat  Respiratory: Negative for cough and shortness of breath  Cardiovascular: Negative for chest pain and palpitations  Gastrointestinal: Positive for blood in stool  Negative for abdominal pain, constipation, diarrhea, nausea and vomiting  Genitourinary: Negative for dysuria and hematuria  Musculoskeletal: Negative for back pain and neck pain  Neurological: Positive for light-headedness  Negative for weakness, numbness and headaches  All other systems reviewed and are negative  Physical Exam  ED Triage Vitals [08/19/22 0017]   Temperature Pulse Respirations Blood Pressure SpO2   (!) 97 4 °F (36 3 °C) 69 18 (!) 82/50 97 %      Temp Source Heart Rate Source Patient Position - Orthostatic VS BP Location FiO2 (%)   Oral Monitor Sitting Left arm --      Pain Score       No Pain             Orthostatic Vital Signs  Vitals:    08/19/22 1447 08/19/22 1500 08/19/22 2300 08/20/22 0730   BP: 114/71 123/76 114/61 117/66   Pulse: 57 61 69 68   Patient Position - Orthostatic VS:  Lying Lying Lying       Physical Exam  Vitals and nursing note reviewed  Constitutional:       General: He is not in acute distress  Appearance: Normal appearance  He is normal weight  He is not diaphoretic  HENT:      Head: Normocephalic and atraumatic  Right Ear: External ear normal       Left Ear: External ear normal       Nose: Nose normal       Mouth/Throat:      Mouth: Mucous membranes are moist       Pharynx: Oropharynx is clear  No oropharyngeal exudate or posterior oropharyngeal erythema  Eyes:      Extraocular Movements: Extraocular movements intact  Conjunctiva/sclera: Conjunctivae normal       Pupils: Pupils are equal, round, and reactive to light     Cardiovascular:      Rate and Rhythm: Normal rate and regular rhythm  Pulses: Normal pulses  Heart sounds: Normal heart sounds  Pulmonary:      Effort: Pulmonary effort is normal  No respiratory distress  Breath sounds: Normal breath sounds  No wheezing or rales  Abdominal:      General: Abdomen is flat  Bowel sounds are normal  There is no distension  Palpations: Abdomen is soft  Tenderness: There is no abdominal tenderness  There is no right CVA tenderness, left CVA tenderness or guarding  Genitourinary:     Comments: No visible hemorrhoid or anal fissure  Dried blood noted around the rectum, heme-positive QC OK  Musculoskeletal:         General: No swelling or tenderness  Normal range of motion  Cervical back: Normal range of motion and neck supple  No tenderness  Skin:     General: Skin is warm and dry  Coloration: Skin is pale  Neurological:      General: No focal deficit present  Mental Status: He is alert and oriented to person, place, and time  Cranial Nerves: No cranial nerve deficit  Sensory: No sensory deficit  Motor: No weakness           ED Medications  Medications   sodium chloride 0 9 % bolus 1,000 mL (0 mL Intravenous Stopped 8/19/22 0204)       Diagnostic Studies  Results Reviewed     Procedure Component Value Units Date/Time    Serial Hemoglobin Q8hrs [[id number]]  (Abnormal) Collected: 08/20/22 0852    Lab Status: Final result Specimen: Blood from Arm, Left Updated: 08/20/22 0904     Hemoglobin 10 7 g/dL     Basic metabolic panel [[id number]] Collected: 08/20/22 0514    Lab Status: Final result Specimen: Blood from Hand, Left Updated: 08/20/22 0645     Sodium 138 mmol/L      Potassium 3 6 mmol/L      Chloride 108 mmol/L      CO2 25 mmol/L      ANION GAP 5 mmol/L      BUN 13 mg/dL      Creatinine 0 84 mg/dL      Glucose 99 mg/dL      Calcium 8 4 mg/dL      eGFR 97 ml/min/1 73sq m     Narrative:      [place] guidelines for Chronic Kidney Disease (CKD):     Stage 1 with normal or high GFR (GFR > 90 mL/min/1 73 square meters)    Stage 2 Mild CKD (GFR = 60-89 mL/min/1 73 square meters)    Stage 3A Moderate CKD (GFR = 45-59 mL/min/1 73 square meters)    Stage 3B Moderate CKD (GFR = 30-44 mL/min/1 73 square meters)    Stage 4 Severe CKD (GFR = 15-29 mL/min/1 73 square meters)    Stage 5 End Stage CKD (GFR <15 mL/min/1 73 square meters)  Note: GFR calculation is accurate only with a steady state creatinine    CBC [[id number]]  (Abnormal) Collected: 08/20/22 0514    Lab Status: Final result Specimen: Blood from Hand, Left Updated: 08/20/22 0631     WBC 9 88 Thousand/uL      RBC 3 49 Million/uL      Hemoglobin 10 3 g/dL      Hematocrit 31 3 %      MCV 90 fL      MCH 29 5 pg      MCHC 32 9 g/dL      RDW 13 5 %      Platelets 350 Thousands/uL      MPV 9 6 fL     Serial Hemoglobin Q8hrs [[id number]]  (Abnormal) Collected: 08/20/22 0109    Lab Status: Final result Specimen: Blood from Hand, Right Updated: 08/20/22 0122     Hemoglobin 9 6 g/dL     Serial Hemoglobin Q8hrs [[id number]]  (Abnormal) Collected: 08/19/22 1817    Lab Status: Final result Specimen: Blood from Arm, Left Updated: 08/19/22 1824     Hemoglobin 10 2 g/dL     Basic metabolic panel [[id number]]  (Abnormal) Collected: 08/19/22 0842    Lab Status: Final result Specimen: Blood from Arm, Left Updated: 08/19/22 0915     Sodium 138 mmol/L      Potassium 4 2 mmol/L      Chloride 110 mmol/L      CO2 24 mmol/L      ANION GAP 4 mmol/L      BUN 18 mg/dL      Creatinine 1 06 mg/dL      Glucose 117 mg/dL      Calcium 7 7 mg/dL      eGFR 78 ml/min/1 73sq m     Narrative:      [place] guidelines for Chronic Kidney Disease (CKD):     Stage 1 with normal or high GFR (GFR > 90 mL/min/1 73 square meters)    Stage 2 Mild CKD (GFR = 60-89 mL/min/1 73 square meters)    Stage 3A Moderate CKD (GFR = 45-59 mL/min/1 73 square meters)    Stage 3B Moderate CKD (GFR = 30-44 mL/min/1 73 square meters)    Stage 4 Severe CKD (GFR = 15-29 mL/min/1 73 square meters)    Stage 5 End Stage CKD (GFR <15 mL/min/1 73 square meters)  Note: GFR calculation is accurate only with a steady state creatinine    CBC and differential [[id number]]  (Abnormal) Collected: 08/19/22 0842    Lab Status: Final result Specimen: Blood from Arm, Left Updated: 08/19/22 0856     WBC 9 07 Thousand/uL      RBC 3 52 Million/uL      Hemoglobin 10 4 g/dL      Hematocrit 31 6 %      MCV 90 fL      MCH 29 5 pg      MCHC 32 9 g/dL      RDW 13 8 %      MPV 9 4 fL      Platelets 234 Thousands/uL      nRBC 0 /100 WBCs      Neutrophils Relative 64 %      Immat GRANS % 1 %      Lymphocytes Relative 20 %      Monocytes Relative 10 %      Eosinophils Relative 5 %      Basophils Relative 0 %      Neutrophils Absolute 5 92 Thousands/µL      Immature Grans Absolute 0 05 Thousand/uL      Lymphocytes Absolute 1 79 Thousands/µL      Monocytes Absolute 0 86 Thousand/µL      Eosinophils Absolute 0 43 Thousand/µL      Basophils Absolute 0 02 Thousands/µL     Serial Hemoglobin Q8hrs [[id number]]  (Abnormal) Collected: 08/19/22 0842    Lab Status: Final result Specimen: Blood from Arm, Left Updated: 08/19/22 0856     Hemoglobin 10 6 g/dL     Magnesium [[id number]]  (Normal) Collected: 08/19/22 0110    Lab Status: Final result Specimen: Blood from Arm, Right Updated: 08/19/22 0546     Magnesium 2 2 mg/dL     Fibrinogen [[id number]]  (Normal) Collected: 08/19/22 0116    Lab Status: Final result Specimen: Blood from Arm, Left Updated: 08/19/22 0206     Fibrinogen 237 mg/dL     APTT [[id number]]  (Normal) Collected: 08/19/22 0116    Lab Status: Final result Specimen: Blood from Arm, Left Updated: 08/19/22 0204     PTT 23 seconds     Thrombin time [[id number]]  (Normal) Collected: 08/19/22 0116    Lab Status: Final result Specimen: Blood from Arm, Left Updated: 08/19/22 0204     Thrombin Time 14 8 seconds     Comprehensive metabolic panel [[id number]]  (Abnormal) Collected: 08/19/22 0110    Lab Status: Final result Specimen: Blood from Arm, Right Updated: 08/19/22 0202     Sodium 137 mmol/L      Potassium 3 7 mmol/L      Chloride 108 mmol/L      CO2 25 mmol/L      ANION GAP 4 mmol/L      BUN 19 mg/dL      Creatinine 1 52 mg/dL      Glucose 143 mg/dL      Calcium 8 8 mg/dL      Corrected Calcium 9 4 mg/dL      AST 17 U/L      ALT 25 U/L      Alkaline Phosphatase 41 U/L      Total Protein 6 4 g/dL      Albumin 3 2 g/dL      Total Bilirubin 0 20 mg/dL      eGFR 50 ml/min/1 73sq m     Narrative:      [employer] guidelines for Chronic Kidney Disease (CKD):     Stage 1 with normal or high GFR (GFR > 90 mL/min/1 73 square meters)    Stage 2 Mild CKD (GFR = 60-89 mL/min/1 73 square meters)    Stage 3A Moderate CKD (GFR = 45-59 mL/min/1 73 square meters)    Stage 3B Moderate CKD (GFR = 30-44 mL/min/1 73 square meters)    Stage 4 Severe CKD (GFR = 15-29 mL/min/1 73 square meters)    Stage 5 End Stage CKD (GFR <15 mL/min/1 73 square meters)  Note: GFR calculation is accurate only with a steady state creatinine    Protime-INR [[id number]]  (Normal) Collected: 08/19/22 0116    Lab Status: Final result Specimen: Blood from Arm, Left Updated: 08/19/22 0156     Protime 13 1 seconds      INR 0 97    CBC and differential [[id number]]  (Abnormal) Collected: 08/19/22 0110    Lab Status: Final result Specimen: Blood from Arm, Right Updated: 08/19/22 0139     WBC 14 27 Thousand/uL      RBC 4 07 Million/uL      Hemoglobin 12 1 g/dL      Hematocrit 36 7 %      MCV 90 fL      MCH 29 7 pg      MCHC 33 0 g/dL      RDW 13 5 %      MPV 9 6 fL      Platelets 431 Thousands/uL      nRBC 0 /100 WBCs      Neutrophils Relative 61 %      Immat GRANS % 0 %      Lymphocytes Relative 23 %      Monocytes Relative 11 %      Eosinophils Relative 5 %      Basophils Relative 0 %      Neutrophils Absolute 8 64 Thousands/µL      Immature Grans Absolute 0 05 Thousand/uL      Lymphocytes Absolute 3 30 Thousands/µL      Monocytes Absolute 1 55 Thousand/µL      Eosinophils Absolute 0 69 Thousand/µL      Basophils Absolute 0 04 Thousands/µL                  No orders to display         Procedures  Procedures      ED Course                                       MDM  Number of Diagnoses or Management Options  Rectal bleeding  S/P colonoscopy  Diagnosis management comments: This is a 59-year-old male, otherwise healthy, presenting for evaluation of multiple episodes of bright red blood per rectum after colonoscopy earlier today  No abdominal pain, nausea, vomiting, diarrhea  His colonoscopy report showed a single polyp that was removed with polypectomy and cautery  He contacted his gastroenterologist and was advised to come to the ER for evaluation, as he may need repeat colonoscopy and clip placement  On exam the patient is afebrile, low normal blood pressure (initial triage pressure was hypotensive, immediate repeat blood pressure was low normal without intervention), otherwise VSS  Heart with regular rate and rhythm, lungs are clear to auscultation bilaterally, abdomen is soft, nondistended, and nontender, no flank tenderness  No visible hemorrhoid or anal fissure  Dried blood noted around the rectum, heme-positive QC OK  Will evaluate with labs including CBC, CMP, coags, type and screen  Plan to admit for serial hemoglobins and likely repeat colonoscopy  IV fluids given  Case discussed with BONNIE for admission        Disposition  Final diagnoses:   Rectal bleeding   S/P colonoscopy     Time reflects when diagnosis was documented in both MDM as applicable and the Disposition within this note     Time User Action Codes Description Comment    8/19/2022  1:10 AM Yonathan Bernard Add [K92 2] Lower GI bleed     8/19/2022  1:11 AM Juancho Price Add [K62 5] Rectal bleeding     8/19/2022  1:11 AM Juancho Price Modify [K92 2] Lower GI bleed     8/19/2022  1:11 AM Juancho Price Modify [K62 5] Rectal bleeding     8/19/2022  1:11 AM Micah Gordon Add [A13 490] S/P colonoscopy     8/19/2022  2:30 PM Catina Washta Modify [K92 2] Lower GI bleed     8/19/2022  2:30 PM Catina Washta Modify [K62 5] Rectal bleeding     8/19/2022  2:30 PM Actina Washta Modify [O11 323] S/P colonoscopy       ED Disposition     ED Disposition   Admit    Condition   Stable    Date/Time   Fri Aug 19, 2022  1:11 AM    Comment   Case was discussed with Dr Luanne Newman, and the patient's admission status was agreed to be Admission Status: observation status to the service of Dr Elease Faes, Costella Meckel  Follow-up Information     Follow up With Specialties Details Why Contact Info Additional Geni Rey MD Family Medicine Follow up in 1 week(s)  [address] [facility] Follow up in 3 week(s)  [address] [facility] [address] [facility] Gastroenterology [facility], [facility] [address], [facility] [place], [place], [address]          There are no discharge medications for this patient  Outpatient Discharge Orders   Ambulatory referral to Gastroenterology   Standing Status: Future Standing Exp  Date: 08/20/23      Discharge Diet     Call provider for: active or persistent bleeding     Call provider for:  severe uncontrolled pain       PDMP Review     None           ED Provider  Attending physically available and evaluated Milton Blake I managed the patient along with the ED Attending      Electronically Signed by         Irene Mitchell MD  08/23/22 7628

## 2022-08-19 NOTE — ASSESSMENT & PLAN NOTE
· Patient with recent colonoscopy with Dr Julio Cunningham to remove 8 mm x 10 mm polyp  Developed significant rectal bleeding post colonoscopy in presented to the emergency department  · Blood pressure is noted to be soft, however patient states that this is normal for him  · He is otherwise stable at this time  · Hemoglobin noted to drop from baseline of around 14 to 10 4 today  · Plan for NPO and flex sigmoidoscopy with GI later today  · Appreciate GI recommendations    · Continue to trend H&H

## 2022-08-19 NOTE — ASSESSMENT & PLAN NOTE
· S/p colonoscopy w/ 1 polypectomy according to notes from [facility], had polyp of approximate 8 to 10 mm in size

## 2022-08-19 NOTE — CONSULTS
Consultation - [address] [facility]  Hilda Lopez 64 y o  male MRN: [id number]  Unit/Bed#: ED 22 Encounter: [id number]        Inpatient consult to gastroenterology  Consult performed by: Dulce Ludwig MD  Consult ordered by: Chrissy Delatorre MD      Reason for Consult / Principal Problem:     Hematochezia    ASSESSMENT AND PLAN:      1  Hematochezia  Patient presenting with multiple episodes of liquidy bowel movements intermixed blood, no significant abdominal pain, urgency, painful defecation  Denies any gagan diarrhea, constipation  Of note patient had colonoscopy done 8/18 which showed 1 sessile polyp 8-10 mm and the distal descending colon which was removed using hot snare  fortunately patient hemodynamics are stable at this time  Hemoglobin dropped from 12 on arrival to 10 4 this morning  BUN negative current platelet count negative  Denies any blood thinner use  -- after prolonged discussion with the patient and his wife at bedside, discussed the risks and benefits of doing of sigmoidoscopy with suspicion for a post polypectomy bleed  -- further recommendations to follow after the procedure  ______________________________________________________________________    HPI:  The patient is a 27-year-old male with no significant past medical history on record who presents earlier today with primary complaints of worsening rectal bleeding  Reports that he had a colonoscopy done yesterday after which he has noticed occasional cramping diffusely in the abdomen along with 2 episodes of bloody stool  Does not report any significant abdominal pain, tenderness, urgency,  Describes bloody stool as toilet bowl field with blood intermixed with liquidy stool  Denies any use of blood thinners, any NSAIDs  Denies any epigastric pain, nausea, vomiting, unintentional weight loss  He denies any significant family history concerning for GI disorders or bleeding disorders      REVIEW OF SYSTEMS:    CONSTITUTIONAL: Denies any fever, chills, rigors, and weight loss  HEENT: No earache or tinnitus  Denies hearing loss or visual disturbances  CARDIOVASCULAR: No chest pain or palpitations  RESPIRATORY: Denies any cough, hemoptysis, shortness of breath or dyspnea on exertion  GASTROINTESTINAL: As noted in the History of Present Illness  GENITOURINARY: No problems with urination  Denies any hematuria or dysuria  NEUROLOGIC: No dizziness or vertigo, denies headaches  MUSCULOSKELETAL: Denies any muscle or joint pain  SKIN: Denies skin rashes or itching  ENDOCRINE: Denies excessive thirst  Denies intolerance to heat or cold  PSYCHOSOCIAL: Denies depression or anxiety  Denies any recent memory loss  Historical Information   No past medical history on file  No past surgical history on file  Social History   Social History     Substance and Sexual Activity   Alcohol Use Not on file     Social History     Substance and Sexual Activity   Drug Use Not on file     Social History     Tobacco Use   Smoking Status Not on file   Smokeless Tobacco Not on file     No family history on file  Meds/Allergies     (Not in a hospital admission)    Current Facility-Administered Medications   Medication Dose Route Frequency    acetaminophen (TYLENOL) tablet 650 mg  650 mg Oral Q6H PRN    ondansetron (ZOFRAN) injection 4 mg  4 mg Intravenous Q6H PRN       No Known Allergies        Objective     Blood pressure 97/64, pulse 66, temperature (!) 97 4 °F (36 3 °C), temperature source Oral, resp  rate 14, SpO2 96 %  There is no height or weight on file to calculate BMI      No intake or output data in the 24 hours ending 08/19/22 0748      PHYSICAL EXAM:      General Appearance:   Alert, cooperative, no distress   HEENT:   Normocephalic, atraumatic, anicteric      Neck:  Supple, symmetrical, trachea midline   Lungs:   Clear to auscultation bilaterally; no rales, rhonchi or wheezing; respirations unlabored  Heart[de-identified]   Regular rate and rhythm; no murmur, rub, or gallop     Abdomen:   Soft, non-tender, non-distended; normal bowel sounds; no masses, no organomegaly    Genitalia:   Deferred    Rectal:   Deferred    Extremities:  No cyanosis, clubbing or edema    Pulses:  2+ and symmetric all extremities    Skin:  No jaundice, rashes, or lesions    Lymph nodes:  No palpable cervical lymphadenopathy        Lab Results:   Admission on 08/19/2022   Component Date Value    WBC 08/19/2022 14 27 (A)    RBC 08/19/2022 4 07     Hemoglobin 08/19/2022 12 1     Hematocrit 08/19/2022 36 7     MCV 08/19/2022 90     MCH 08/19/2022 29 7     MCHC 08/19/2022 33 0     RDW 08/19/2022 13 5     MPV 08/19/2022 9 6     Platelets 54/48/7739 381     nRBC 08/19/2022 0     Neutrophils Relative 08/19/2022 61     Immat GRANS % 08/19/2022 0     Lymphocytes Relative 08/19/2022 23     Monocytes Relative 08/19/2022 11     Eosinophils Relative 08/19/2022 5     Basophils Relative 08/19/2022 0     Neutrophils Absolute 08/19/2022 8 64 (A)    Immature Grans Absolute 08/19/2022 0 05     Lymphocytes Absolute 08/19/2022 3 30     Monocytes Absolute 08/19/2022 1 55 (A)    Eosinophils Absolute 08/19/2022 0 69 (A)    Basophils Absolute 08/19/2022 0 04     ABO Grouping 08/19/2022 A     Rh Factor 08/19/2022 Positive     Antibody Screen 08/19/2022 Negative     Specimen Expiration Date 08/19/2022 [id number]     Sodium 08/19/2022 137     Potassium 08/19/2022 3 7     Chloride 08/19/2022 108     CO2 08/19/2022 25     ANION GAP 08/19/2022 4     BUN 08/19/2022 19     Creatinine 08/19/2022 1 52 (A)    Glucose 08/19/2022 143 (A)    Calcium 08/19/2022 8 8     Corrected Calcium 08/19/2022 9 4     AST 08/19/2022 17     ALT 08/19/2022 25     Alkaline Phosphatase 08/19/2022 41 (A)    Total Protein 08/19/2022 6 4     Albumin 08/19/2022 3 2 (A)    Total Bilirubin 08/19/2022 0 20     eGFR 08/19/2022 50     Protime 08/19/2022 13 1     INR 08/19/2022 0 97     PTT 08/19/2022 23     Thrombin Time 08/19/2022 14 8     Fibrinogen 08/19/2022 237     Magnesium 08/19/2022 2 2     Ventricular Rate 08/19/2022 66     Atrial Rate 08/19/2022 66     MA Interval 08/19/2022 126     QRSD Interval 08/19/2022 94     QT Interval 08/19/2022 398     QTC Interval 08/19/2022 417     P Axis 08/19/2022 39     QRS Axis 08/19/2022 13     T Wave Axis 08/19/2022 0     ABO Grouping 08/19/2022 A     Rh Factor 08/19/2022 Positive        Imaging Studies: I have personally reviewed pertinent imaging studies

## 2022-08-20 VITALS
RESPIRATION RATE: 18 BRPM | SYSTOLIC BLOOD PRESSURE: 117 MMHG | OXYGEN SATURATION: 96 % | HEART RATE: 68 BPM | TEMPERATURE: 98.2 F | WEIGHT: 185 LBS | BODY MASS INDEX: 27.4 KG/M2 | HEIGHT: 69 IN | DIASTOLIC BLOOD PRESSURE: 66 MMHG

## 2022-08-20 LAB
ANION GAP SERPL CALCULATED.3IONS-SCNC: 5 MMOL/L (ref 4–13)
BUN SERPL-MCNC: 13 MG/DL (ref 5–25)
CALCIUM SERPL-MCNC: 8.4 MG/DL (ref 8.3–10.1)
CHLORIDE SERPL-SCNC: 108 MMOL/L (ref 96–108)
CO2 SERPL-SCNC: 25 MMOL/L (ref 21–32)
CREAT SERPL-MCNC: 0.84 MG/DL (ref 0.6–1.3)
ERYTHROCYTE [DISTWIDTH] IN BLOOD BY AUTOMATED COUNT: 13.5 % (ref 11.6–15.1)
GFR SERPL CREATININE-BSD FRML MDRD: 97 ML/MIN/1.73SQ M
GLUCOSE SERPL-MCNC: 99 MG/DL (ref 65–140)
HCT VFR BLD AUTO: 31.3 % (ref 36.5–49.3)
HGB BLD-MCNC: 10.3 G/DL (ref 12–17)
HGB BLD-MCNC: 10.7 G/DL (ref 12–17)
HGB BLD-MCNC: 9.6 G/DL (ref 12–17)
MCH RBC QN AUTO: 29.5 PG (ref 26.8–34.3)
MCHC RBC AUTO-ENTMCNC: 32.9 G/DL (ref 31.4–37.4)
MCV RBC AUTO: 90 FL (ref 82–98)
PLATELET # BLD AUTO: 271 THOUSANDS/UL (ref 149–390)
PMV BLD AUTO: 9.6 FL (ref 8.9–12.7)
POTASSIUM SERPL-SCNC: 3.6 MMOL/L (ref 3.5–5.3)
RBC # BLD AUTO: 3.49 MILLION/UL (ref 3.88–5.62)
SODIUM SERPL-SCNC: 138 MMOL/L (ref 135–147)
WBC # BLD AUTO: 9.88 THOUSAND/UL (ref 4.31–10.16)

## 2022-08-20 PROCEDURE — 85018 HEMOGLOBIN: CPT | Performed by: INTERNAL MEDICINE

## 2022-08-20 PROCEDURE — 99239 HOSP IP/OBS DSCHRG MGMT >30: CPT | Performed by: PHYSICIAN ASSISTANT

## 2022-08-20 PROCEDURE — 85027 COMPLETE CBC AUTOMATED: CPT | Performed by: PHYSICIAN ASSISTANT

## 2022-08-20 PROCEDURE — 80048 BASIC METABOLIC PNL TOTAL CA: CPT | Performed by: PHYSICIAN ASSISTANT

## 2022-08-20 NOTE — ASSESSMENT & PLAN NOTE
· Patient with recent colonoscopy with Dr Garret Rico to remove 8 mm x 10 mm polyp  Developed significant rectal bleeding post colonoscopy in presented to the emergency department  · Blood pressure is noted to be soft, however patient states that this is normal for him  · He is otherwise stable at this time  · Hemoglobin noted to drop from baseline of around 14 to 10 4 today  · S/p flex sigmoidoscopy w/ GI 8/19 with visible vessel and clot in descending colon in area of prior polypectomy  S/p cautery     · hgb stable at 10 3   · No further bleeding   · Outpatient follow up with GI and PCP   · Stable for discharge

## 2022-08-20 NOTE — UTILIZATION REVIEW
Initial Clinical Review    Admission: Date/Time/Statement:   Admission Orders (From admission, onward)     Ordered        08/19/22 0111  Inpatient Admission  Once                      Orders Placed This Encounter   Procedures    Inpatient Admission     Standing Status:   Standing     Number of Occurrences:   1     Order Specific Question:   Level of Care     Answer:   Med Surg [16]     Order Specific Question:   Estimated length of stay     Answer:   More than 2 Midnights     Order Specific Question:   Certification     Answer:   I certify that inpatient services are medically necessary for this patient for a duration of greater than two midnights  See H&P and MD Progress Notes for additional information about the patient's course of treatment  ED Arrival Information     Expected   -    Arrival   8/18/2022 23:41    Acuity   Urgent            Means of arrival   Walk-In    Escorted by   Family Member    Service   Hospitalist    Admission type   Urgent            Arrival complaint   rectal bleeding           Chief Complaint   Patient presents with    Rectal Bleeding     Pt had a colonoscopy yesterday around 0900, has been bleeding rectally ever since, pt states bleeding and going to the bathroom has gotten worse since after dinner and pt feels "he loses a pint of bright red blood every time he goes to the bathroom"       Initial Presentation: 64 y o  male with PMH of seasonal allergy presented to the ED form home with worsening rectal bleeding and lightheadedness    Pt had a colonoscopy w/ polypectomy done yesterday and noted occasional cramping  Diffusely in the abdomen w/ 2 episodes of bloody stool after  Described stool as blood intermixed with liquidy stool  In the ED, BP 82/50  Hgb 12 1-> 10 4, last hgb 14 4 on 11/20212  Cr 1 52, baseline 1  Given 1 L IVF  Plan: Inpatient admission for evaluation and treatment of lower GI bleed: NPO  GI Consult  Hgb q8h  Coagulation profile   Check Orthostatics BP     08/19   GI Consult: Hematochezia: Pt had colosnoscopy w/ polypectomy; 8-10 mm polyp removed  yesterday  He had multiple bloody BMs  Hemoglobin dropped from 12 on presentation down to 10  Because of significant drop in hemoglobin and ongoing bleeding, recommend flexible sigmoidoscopy for further evaluation and hemostatic intervention  IM Notes: Pt reports decrease in rectal bleeding  He is mildly hypotensive  Plan for NPO and flex sigmoidoscopy with GI later today  Cont to trend H&H  Cont to mon creatinine  Date: 08/20  Day 2:    IM Notes: Pt w/ no complaints today  underwent flex sigmoidoscopy with GI on 8/19 showing area of prior polypectomy with blood clot residual, no active bleeding  This was cauterized and ultra clips were placed by GI  She had BM overnight w/ w/ no significant blood    Cr improved w/ IVF  OP f/u w/ GI     On exam, abd soft, nd, nt      ED Triage Vitals [08/19/22 0017]   Temperature Pulse Respirations Blood Pressure SpO2   (!) 97 4 °F (36 3 °C) 69 18 (!) 82/50 97 %      Temp Source Heart Rate Source Patient Position - Orthostatic VS BP Location FiO2 (%)   Oral Monitor Sitting Left arm --      Pain Score       No Pain          Wt Readings from Last 1 Encounters:   08/19/22 83 9 kg (185 lb)     Additional Vital Signs:   Date/Time Temp Pulse Resp BP MAP (mmHg) SpO2 O2 Device Patient Position - Orthostatic VS   08/20/22 0956 -- -- -- -- -- -- None (Room air) --   08/20/22 0730 98 2 °F (36 8 °C) 68 18 117/66 77 96 % None (Room air) Lying   08/19/22 2300 98 5 °F (36 9 °C) 69 19 114/61 77 96 % None (Room air) Lying   08/19/22 2203 -- -- -- -- -- -- None (Room air) --   08/19/22 1500 98 2 °F (36 8 °C) 61 16 123/76 93 96 % None (Room air) Lying   08/19/22 1447 -- 57 18 114/71 -- 99 % None (Room air) --   08/19/22 1432 96 1 °F (35 6 °C) Abnormal  75 20 111/70 -- 99 % -- --   08/19/22 1331 97 7 °F (36 5 °C) 71 18 131/76 -- 96 % None (Room air) --   08/19/22 1200 -- 63 14 118/63 84 96 % -- --   08/19/22 1100 -- 64 20 119/72 90 97 % -- --   08/19/22 0830 -- -- -- -- -- -- None (Room air) --   08/19/22 0800 -- 61 12 100/62 77 97 % -- --   08/19/22 0636 -- -- -- 97/64 77 -- -- --   08/19/22 0635 -- 66 14 -- -- 96 % -- --   08/19/22 0430 -- 67 15 108/61 80 96 % None (Room air) --   08/19/22 0300 -- 74 16 103/58 76 95 % None (Room air) --   08/19/22 0200 -- 72 20 111/62 82 94 % None (Room air) --   08/19/22 0025 -- -- -- -- -- -- None (Room air) --   08/19/22 0020 -- 73 -- 103/60 75 97 % -- --       Pertinent Labs/Diagnostic Test Results:   08/19 EKG result: NSR      Results from last 7 days   Lab Units 08/20/22  0852 08/20/22  0514 08/20/22  0109 08/19/22  1817 08/19/22  0842 08/19/22  0110   WBC Thousand/uL  --  9 88  --   --  9 07 14 27*   HEMOGLOBIN g/dL 10 7* 10 3* 9 6* 10 2* 10 4*  10 6* 12 1   HEMATOCRIT %  --  31 3*  --   --  31 6* 36 7   PLATELETS Thousands/uL  --  271  --   --  276 381   NEUTROS ABS Thousands/µL  --   --   --   --  5 92 8 64*         Results from last 7 days   Lab Units 08/20/22  0514 08/19/22  0842 08/19/22  0110   SODIUM mmol/L 138 138 137   POTASSIUM mmol/L 3 6 4 2 3 7   CHLORIDE mmol/L 108 110* 108   CO2 mmol/L 25 24 25   ANION GAP mmol/L 5 4 4   BUN mg/dL 13 18 19   CREATININE mg/dL 0 84 1 06 1 52*   EGFR ml/min/1 73sq m 97 78 50   CALCIUM mg/dL 8 4 7 7* 8 8   MAGNESIUM mg/dL  --   --  2 2     Results from last 7 days   Lab Units 08/19/22  0110   AST U/L 17   ALT U/L 25   ALK PHOS U/L 41*   TOTAL PROTEIN g/dL 6 4   ALBUMIN g/dL 3 2*   TOTAL BILIRUBIN mg/dL 0 20         Results from last 7 days   Lab Units 08/20/22  0514 08/19/22  0842 08/19/22  0110   GLUCOSE RANDOM mg/dL 99 117 143*     Results from last 7 days   Lab Units 08/19/22  0116   PROTIME seconds 13 1   INR  0 97   PTT seconds 23     ED Treatment:   Medication Administration from 08/18/2022 2341 to 08/19/2022 1636       Date/Time Order Dose Route Action     08/19/2022 0204 sodium chloride 0 9 % bolus 1,000 mL 0 mL Intravenous Stopped     08/19/2022 0104 sodium chloride 0 9 % bolus 1,000 mL 1,000 mL Intravenous New Bag        History reviewed  No pertinent past medical history  Present on Admission:  **None**      Admitting Diagnosis: Rectal bleeding [K62 5]  Lower GI bleed [K92 2]  S/P colonoscopy [Z98 890]  Age/Sex: 64 y o  male  Admission Orders:  SCD  I/O   Stool record at bedside  Serial hgb q8h  Orthostatic BP    Scheduled Medications:     Continuous IV Infusions: none     PRN Meds:  acetaminophen, 650 mg, Oral, Q6H PRN  ondansetron, 4 mg, Intravenous, Q6H PRN        IP CONSULT TO GASTROENTEROLOGY    Network Utilization Review Department  ATTENTION: Please call with any questions or concerns to [phone] and carefully listen to the prompts so that you are directed to the right person  All voicemails are confidential   Silvana Alley all requests for admission clinical reviews, approved or denied determinations and any other requests to dedicated fax number below belonging to the campus where the patient is receiving treatment   List of dedicated fax numbers for the Facilities:  [address] DENIALS (Administrative/Medical Necessity) [phone]   [address] (Maternity/NICU/Pediatrics) [phone]   [address]  [address] [facility] [place] [address] [address] [place] [address] [address] [facility] [place] [address] [phone]

## 2022-08-20 NOTE — ASSESSMENT & PLAN NOTE
· S/p colonoscopy w/ 1 polypectomy according to notes from [facility], had polyp of approximate 8 to 10 mm in size     · Plan as above

## 2022-08-20 NOTE — PLAN OF CARE
Problem: PAIN - ADULT  Goal: Verbalizes/displays adequate comfort level or baseline comfort level  Description: Interventions:  - Encourage patient to monitor pain and request assistance  - Assess pain using appropriate pain scale  - Administer analgesics based on type and severity of pain and evaluate response  - Implement non-pharmacological measures as appropriate and evaluate response  - Consider cultural and social influences on pain and pain management  - Notify physician/advanced practitioner if interventions unsuccessful or patient reports new pain  Outcome: Progressing     Problem: INFECTION - ADULT  Goal: Absence or prevention of progression during hospitalization  Description: INTERVENTIONS:  - Assess and monitor for signs and symptoms of infection  - Monitor lab/diagnostic results  - Monitor all insertion sites, i e  indwelling lines, tubes, and drains  - Monitor endotracheal if appropriate and nasal secretions for changes in amount and color  - [place] appropriate cooling/warming therapies per order  - Administer medications as ordered  - Instruct and encourage patient and family to use good hand hygiene technique  - Identify and instruct in appropriate isolation precautions for identified infection/condition  Outcome: Progressing  Goal: Absence of fever/infection during neutropenic period  Description: INTERVENTIONS:  - Monitor WBC    Outcome: Progressing     Problem: SAFETY ADULT  Goal: Patient will remain free of falls  Description: INTERVENTIONS:  - Educate patient/family on patient safety including physical limitations  - Instruct patient to call for assistance with activity   - Consult OT/PT to assist with strengthening/mobility   - Keep Call bell within reach  - Keep bed low and locked with side rails adjusted as appropriate  - Keep care items and personal belongings within reach  - Initiate and maintain comfort rounds  - Make Fall Risk Sign visible to staff  - Offer Toileting every   Hours, in advance of need  - Initiate/Maintain   alarm  - Obtain necessary fall risk management equipment:     - Apply yellow socks and bracelet for high fall risk patients  - Consider moving patient to room near nurses station  Outcome: Progressing  Goal: Maintain or return to baseline ADL function  Description: INTERVENTIONS:  -  Assess patient's ability to carry out ADLs; assess patient's baseline for ADL function and identify physical deficits which impact ability to perform ADLs (bathing, care of mouth/teeth, toileting, grooming, dressing, etc )  - Assess/evaluate cause of self-care deficits   - Assess range of motion  - Assess patient's mobility; develop plan if impaired  - Assess patient's need for assistive devices and provide as appropriate  - Encourage maximum independence but intervene and supervise when necessary  - Involve family in performance of ADLs  - Assess for home care needs following discharge   - Consider OT consult to assist with ADL evaluation and planning for discharge  - Provide patient education as appropriate  Outcome: Progressing  Goal: Maintains/Returns to pre admission functional level  Description: INTERVENTIONS:  - Perform BMAT or MOVE assessment daily    - Set and communicate daily mobility goal to care team and patient/family/caregiver  - Collaborate with rehabilitation services on mobility goals if consulted  - Perform Range of Motion      times a day  - Reposition patient every    hours    - Dangle patient    times a day  - Stand patient    times a day  - Ambulate patient    times a day  - Out of bed to chair      times a day   - Out of bed for meals    times a day  - Out of bed for toileting  - Record patient progress and toleration of activity level   Outcome: Progressing     Problem: DISCHARGE PLANNING  Goal: Discharge to home or other facility with appropriate resources  Description: INTERVENTIONS:  - Identify barriers to discharge w/patient and caregiver  - Arrange for needed discharge resources and transportation as appropriate  - Identify discharge learning needs (meds, wound care, etc )  - Arrange for interpretive services to assist at discharge as needed  - Refer to Case Management Department for coordinating discharge planning if the patient needs post-hospital services based on physician/advanced practitioner order or complex needs related to functional status, cognitive ability, or social support system  Outcome: Progressing     Problem: Knowledge Deficit  Goal: Patient/family/caregiver demonstrates understanding of disease process, treatment plan, medications, and discharge instructions  Description: Complete learning assessment and assess knowledge base    Interventions:  - Provide teaching at level of understanding  - Provide teaching via preferred learning methods  Outcome: Progressing     Problem: GASTROINTESTINAL - ADULT  Goal: Minimal or absence of nausea and/or vomiting  Description: INTERVENTIONS:  - Administer IV fluids if ordered to ensure adequate hydration  - Maintain NPO status until nausea and vomiting are resolved  - Nasogastric tube if ordered  - Administer ordered antiemetic medications as needed  - Provide nonpharmacologic comfort measures as appropriate  - Advance diet as tolerated, if ordered  - Consider nutrition services referral to assist patient with adequate nutrition and appropriate food choices  Outcome: Progressing  Goal: Maintains or returns to baseline bowel function  Description: INTERVENTIONS:  - Assess bowel function  - Encourage oral fluids to ensure adequate hydration  - Administer IV fluids if ordered to ensure adequate hydration  - Administer ordered medications as needed  - Encourage mobilization and activity  - Consider nutritional services referral to assist patient with adequate nutrition and appropriate food choices  Outcome: Progressing  Goal: Maintains adequate nutritional intake  Description: INTERVENTIONS:  - Monitor percentage of each meal consumed  - Identify factors contributing to decreased intake, treat as appropriate  - Assist with meals as needed  - Monitor I&O, weight, and lab values if indicated  - Obtain nutrition services referral as needed  Outcome: Progressing  Goal: Establish and maintain optimal ostomy function  Description: INTERVENTIONS:  - Assess bowel function  - Encourage oral fluids to ensure adequate hydration  - Administer IV fluids if ordered to ensure adequate hydration   - Administer ordered medications as needed  - Encourage mobilization and activity  - Nutrition services referral to assist patient with appropriate food choices  - Assess stoma site  - Consider wound care consult   Outcome: Progressing  Goal: Oral mucous membranes remain intact  Description: INTERVENTIONS  - Assess oral mucosa and hygiene practices  - Implement preventative oral hygiene regimen  - Implement oral medicated treatments as ordered  - Initiate Nutrition services referral as needed  Outcome: Progressing

## 2022-08-20 NOTE — DISCHARGE INSTR - AVS FIRST PAGE
Please follow up w/ Dr Yury Nelson of GI in 3 weeks for repeat colonoscopy  Follow up with you pcp in 1-2 weeks  Return to the ED immediately for repeat bleeding, dark or bloody stools, extreme fatigue, lightheadedness, or dizziness

## 2022-08-20 NOTE — DISCHARGE SUMMARY
[address] [facility]  Discharge- Oswaldo Carrington 1965, 64 y o  male MRN: [id number]  Unit/Bed#: [facility] 320-01 Encounter: [id number]  Primary Care Provider: Nancy Cabello MD   Date and time admitted to hospital: 8/19/2022 12:15 AM    * Lower GI bleeding  Assessment & Plan  · Patient with recent colonoscopy with Dr Sarbjit Sinclair to remove 8 mm x 10 mm polyp  Developed significant rectal bleeding post colonoscopy in presented to the emergency department  · Blood pressure is noted to be soft, however patient states that this is normal for him  · He is otherwise stable at this time  · Hemoglobin noted to drop from baseline of around 14 to 10 4 today  · S/p flex sigmoidoscopy w/ GI 8/19 with visible vessel and clot in descending colon in area of prior polypectomy  S/p cautery  · hgb stable at 10 3   · No further bleeding   · Outpatient follow up with GI and PCP   · Stable for discharge       DILIA (acute kidney injury) ([facility] [address] )  Assessment & Plan  · Present on admission creatinine 1 52   · Prior baseline appears to be closer to 1   · Resolved s/p IVF   · Likely from hypovolemia       Status post colonoscopy with polypectomy  Assessment & Plan  · S/p colonoscopy w/ 1 polypectomy according to notes from [facility], had polyp of approximate 8 to 10 mm in size  · Plan as above     Seasonal allergies  Assessment & Plan  · Patient on occasional use of Claritin, also occasionally takes pseudoephedrine        Medical Problems             Resolved Problems  Date Reviewed: 8/20/2022   None               Discharging Physician / Practitioner: Brigid Hammer PA-C  PCP: Nancy Cabello MD  Admission Date:   Admission Orders (From admission, onward)     Ordered        08/19/22 0111  Inpatient Admission  Once                      Discharge Date: 08/20/22    Consultations During Hospital Stay:  · GI     Procedures Performed:   Flexible Sigmoidoscopy - Impression: 5 mm polyp removed from the transverse colon using cold snare Abnormal mucosa in the descending colon  Previous area of polypectomy seen in the descending colon with visible vessel and blood clots, no active bleeding  Gold probe cauterization done and then ultra clips were placed  One fold distal to it was tattooed because residual polypoid tissue was seen  RECOMMENDATION: Schedule repeat sigmoidoscopy in 3 months with Dr Peck Lamp Findings / Test Results:   · DILIA - resolved post fluids   · Flex sigmoidoscopy results as noted above     Incidental Findings:   · As above      Test Results Pending at Discharge (will require follow up): · None      Outpatient Tests Requested:  · None     Complications:  None     Reason for Admission: GI bleed     Hospital Course:   Irene Manzo is a 64 y o  male patient who originally presented to the hospital on 8/19/2022 due to GI bleed  Patient presented for episode of GI bleeding post colonoscopy in  outpatient setting after polyp removal  hgb was trended and at lowest noted to be 9 6 down from admission hgb of 12  1  he underwent flex sigmoidoscopy with GI on 8/19 showing area of prior polypectomy with blood clot residual, however no active bleeding  This was cauterized and ultra clips were placed by GI and the patient was returned to the floor in stable condition  From GI stand point he was cleared for discharge 8/20 with outpatient follow up with Dr Kaden Blackwood of GI in 3 months for repeat colonoscopy  He was noted to have DILIA w/ Cr 1 5 on admission which normalized to the patients baseline of 1 0 after 1 L of IVF likely in the setting of hypovolemia from bowel prep  For further hospital coarse, please see attached notes  Please see above list of diagnoses and related plan for additional information  Condition at Discharge: good    Discharge Day Visit / Exam:   Subjective:  No acute complaints today  Had a bowel movement overnight and did not have any significant blood   States he would prefer to follow up with our GI team    Vitals: Blood Pressure: 114/61 (08/19/22 2300)  Pulse: 69 (08/19/22 2300)  Temperature: 98 5 °F (36 9 °C) (08/19/22 2300)  Temp Source: Oral (08/19/22 2300)  Respirations: 19 (08/19/22 2300)  Height: 5' 9" (175 3 cm) (08/19/22 1331)  Weight - Scale: 83 9 kg (185 lb) (08/19/22 1331)  SpO2: 96 % (08/19/22 2300)  Exam:   Physical Exam  Constitutional:       General: He is not in acute distress  HENT:      Head: Normocephalic and atraumatic  Mouth/Throat:      Mouth: Mucous membranes are moist       Pharynx: Oropharynx is clear  Eyes:      General:         Right eye: No discharge  Left eye: No discharge  Cardiovascular:      Rate and Rhythm: Normal rate and regular rhythm  Heart sounds: Normal heart sounds  Pulmonary:      Effort: Pulmonary effort is normal       Breath sounds: Normal breath sounds  Abdominal:      General: Abdomen is flat  There is no distension  Palpations: Abdomen is soft  Tenderness: There is no abdominal tenderness  Musculoskeletal:      Cervical back: No muscular tenderness  Right lower leg: No edema  Left lower leg: No edema  Skin:     General: Skin is warm and dry  Coloration: Skin is not jaundiced  Neurological:      Mental Status: He is alert and oriented to person, place, and time  Mental status is at baseline  Psychiatric:         Mood and Affect: Mood normal           Discussion with Family: Updated  (wife) via phone  Discharge instructions/Information to patient and family:   See after visit summary for information provided to patient and family  Provisions for Follow-Up Care:  See after visit summary for information related to follow-up care and any pertinent home health orders  Disposition:   Home    Planned Readmission: none      Discharge Statement:  I spent 60 minutes discharging the patient  This time was spent on the day of discharge   I had direct contact with the patient on the day of discharge  Greater than 50% of the total time was spent examining patient, answering all patient questions, arranging and discussing plan of care with patient as well as directly providing post-discharge instructions  Additional time then spent on discharge activities  Discharge Medications:  See after visit summary for reconciled discharge medications provided to patient and/or family        **Please Note: This note may have been constructed using a voice recognition system**

## 2022-08-20 NOTE — ASSESSMENT & PLAN NOTE
· Present on admission creatinine 1 52   · Prior baseline appears to be closer to 1   · Resolved s/p IVF   · Likely from hypovolemia

## 2022-08-22 NOTE — UTILIZATION REVIEW
Notification of Discharge   This is a Notification of Discharge from our facility [address]  Please be advised that this patient has been discharge from our facility  Below you will find the admission and discharge date and time including the patients disposition  UTILIZATION REVIEW CONTACT:  [address]  Utilization   Network Utilization Review Department  Phone: [phone] x carefully listen to the prompts  All voicemails are confidential   Email: [email or web address]     PHYSICIAN ADVISORY SERVICES:  FOR WEWU-BP-BVYN REVIEW - MEDICAL NECESSITY DENIAL  Phone: [phone]  Fax: [phone]  Email: [email or web address]     PRESENTATION DATE: 8/19/2022 12:15 AM  OBERVATION ADMISSION DATE:  INPATIENT ADMISSION DATE: 8/19/22  1:11 AM   DISCHARGE DATE: 8/20/2022 11:58 AM  DISPOSITION: Home/Self Care Home/Self Care      IMPORTANT INFORMATION:  Send all requests for admission clinical reviews, approved or denied determinations and any other requests to dedicated fax number below belonging to the campus where the patient is receiving treatment   List of dedicated fax numbers:  [address] DENIALS (Administrative/Medical Necessity) [phone]   [address] (Maternity/NICU/Pediatrics) [phone]   [facility] [phone]   [address] [facility] [phone]   [address] [place] [address] [phone]   [address] [place] [address] [address] [place] [address],[address] [phone]   [facility]  [phone]   [address] [place] [address]  [address] [facility] 1000 [facility] [phone]

## 2022-08-26 ENCOUNTER — APPOINTMENT (OUTPATIENT)
Dept: LAB | Facility: CLINIC | Age: 57
End: 2022-08-26
Payer: COMMERCIAL

## 2022-08-26 DIAGNOSIS — I10 ESSENTIAL HYPERTENSION, MALIGNANT: ICD-10-CM

## 2022-08-26 DIAGNOSIS — D64.9 ANEMIA, UNSPECIFIED TYPE: ICD-10-CM

## 2022-08-26 DIAGNOSIS — I48.91 ATRIAL FIBRILLATION, UNSPECIFIED TYPE (HCC): ICD-10-CM

## 2022-08-26 LAB
BASOPHILS # BLD AUTO: 0.12 THOUSANDS/ΜL (ref 0–0.1)
BASOPHILS NFR BLD AUTO: 1 % (ref 0–1)
EOSINOPHIL # BLD AUTO: 0.86 THOUSAND/ΜL (ref 0–0.61)
EOSINOPHIL NFR BLD AUTO: 9 % (ref 0–6)
ERYTHROCYTE [DISTWIDTH] IN BLOOD BY AUTOMATED COUNT: 13.2 % (ref 11.6–15.1)
FERRITIN SERPL-MCNC: 54 NG/ML (ref 8–388)
HCT VFR BLD AUTO: 34.6 % (ref 36.5–49.3)
HGB BLD-MCNC: 11.3 G/DL (ref 12–17)
IMM GRANULOCYTES # BLD AUTO: 0.11 THOUSAND/UL (ref 0–0.2)
IMM GRANULOCYTES NFR BLD AUTO: 1 % (ref 0–2)
INR PPP: 0.93 (ref 0.84–1.19)
IRON SATN MFR SERPL: 14 % (ref 20–50)
IRON SERPL-MCNC: 48 UG/DL (ref 65–175)
LYMPHOCYTES # BLD AUTO: 2.34 THOUSANDS/ΜL (ref 0.6–4.47)
LYMPHOCYTES NFR BLD AUTO: 25 % (ref 14–44)
MCH RBC QN AUTO: 29.5 PG (ref 26.8–34.3)
MCHC RBC AUTO-ENTMCNC: 32.7 G/DL (ref 31.4–37.4)
MCV RBC AUTO: 90 FL (ref 82–98)
MONOCYTES # BLD AUTO: 0.81 THOUSAND/ΜL (ref 0.17–1.22)
MONOCYTES NFR BLD AUTO: 9 % (ref 4–12)
NEUTROPHILS # BLD AUTO: 5.31 THOUSANDS/ΜL (ref 1.85–7.62)
NEUTS SEG NFR BLD AUTO: 55 % (ref 43–75)
NRBC BLD AUTO-RTO: 0 /100 WBCS
PLATELET # BLD AUTO: 449 THOUSANDS/UL (ref 149–390)
PMV BLD AUTO: 9.7 FL (ref 8.9–12.7)
PROTHROMBIN TIME: 12.7 SECONDS (ref 11.6–14.5)
RBC # BLD AUTO: 3.83 MILLION/UL (ref 3.88–5.62)
TIBC SERPL-MCNC: 342 UG/DL (ref 250–450)
WBC # BLD AUTO: 9.55 THOUSAND/UL (ref 4.31–10.16)

## 2022-08-26 PROCEDURE — 85025 COMPLETE CBC W/AUTO DIFF WBC: CPT

## 2022-08-26 PROCEDURE — 83550 IRON BINDING TEST: CPT

## 2022-08-26 PROCEDURE — 83540 ASSAY OF IRON: CPT

## 2022-08-26 PROCEDURE — 82728 ASSAY OF FERRITIN: CPT

## 2022-08-26 PROCEDURE — 36415 COLL VENOUS BLD VENIPUNCTURE: CPT

## 2022-08-26 PROCEDURE — 85610 PROTHROMBIN TIME: CPT

## 2022-11-29 ENCOUNTER — CONSULT (OUTPATIENT)
Dept: GASTROENTEROLOGY | Facility: CLINIC | Age: 57
End: 2022-11-29

## 2022-11-29 VITALS
TEMPERATURE: 98.1 F | SYSTOLIC BLOOD PRESSURE: 120 MMHG | DIASTOLIC BLOOD PRESSURE: 78 MMHG | WEIGHT: 197 LBS | BODY MASS INDEX: 29.18 KG/M2 | HEIGHT: 69 IN

## 2022-11-29 DIAGNOSIS — K92.2 LOWER GI BLEEDING: ICD-10-CM

## 2022-11-29 DIAGNOSIS — Z98.890 STATUS POST COLONOSCOPY WITH POLYPECTOMY: ICD-10-CM

## 2022-11-29 DIAGNOSIS — K62.5 RECTAL BLEEDING: ICD-10-CM

## 2022-11-29 DIAGNOSIS — D12.4 ADENOMATOUS POLYP OF DESCENDING COLON: Primary | ICD-10-CM

## 2022-11-29 NOTE — PATIENT INSTRUCTIONS
Scheduled date of colonoscopy (as of today):12 19 22  Physician performing colonoscopy:DR CISNEROS  Location of colonoscopy:[facility]  Bowel prep reviewed with patient:DULCOLAX/MIRALAX  Instructions reviewed with patient by:OFFICE  Clearances:  N/A  Ok'd by Dr Conrad Harmon and Ciro Healy

## 2022-11-29 NOTE — PROGRESS NOTES
[facility] - Outpatient Follow-up Note  Joan Mcconnell 62 y o  male MRN: [id number]  Encounter: [id number]          ASSESSMENT AND PLAN:      1  Adenomatous polyp in the descending colon  2  Post polypectomy bleeding  3  Anemia secondary to post polypectomy bleeding    -I will schedule him for colonoscopy for removal of residual polyp in the descending colon  -bowel prep instructions given  -he will get blood work done to check his hemoglobin through his primary care doctor in January 2023  -we discussed risks and benefits of procedure risks include but not limited to infection, bleeding, perforation, missed lesion  Follow-up with me as needed    ______________________________________________________________________    SUBJECTIVE:  51-year-old male who was admitted in August 2022 with post polypectomy bleeding here for follow-up visit  He had colonoscopy on 8/18/2022 by Dr Lio Timmons  He was admitted with post polypectomy bleeding  He underwent colonoscopy which showed partially removed polyp in the descending colon and visible vessel at the site of polypectomy  Hemoclips were placed with successful hemostasis  Tattoo was placed to localize this polyp  After polypectomy, he was having change in bowel habit alternating between diarrhea and constipation  Now he is doing  Denies abdominal pain, melena or hematochezia  His last hemoglobin was 11 3 -checked 1 week after discharge    Additional 5 mm polyp was removed from transverse colon using cold snare    REVIEW OF SYSTEMS IS OTHERWISE NEGATIVE  Historical Information   History reviewed  No pertinent past medical history  History reviewed  No pertinent surgical history    Social History   Social History     Substance and Sexual Activity   Alcohol Use Not Currently    Comment: social     Social History     Substance and Sexual Activity   Drug Use Never     Social History     Tobacco Use   Smoking Status Former   • Packs/day: 0 25 • Years: 2 00   • Pack years: 0 50   • Types: Cigarettes   • Quit date: 18   • Years since quittin 5   Smokeless Tobacco Never     Family History   Problem Relation Age of Onset   • Crohn's disease Mother    • Stomach cancer Maternal Grandfather        Meds/Allergies     No current outpatient medications on file  No Known Allergies        Objective     Blood pressure 120/78, temperature 98 1 °F (36 7 °C), temperature source Tympanic, height 5' 9" (1 753 m), weight 89 4 kg (197 lb)  Body mass index is 29 09 kg/m²  PHYSICAL EXAM:      General Appearance:   Alert, cooperative, no distress   HEENT:   Normocephalic, atraumatic, anicteric      Neck:  Supple, symmetrical, trachea midline   Lungs:   Clear to auscultation bilaterally; no rales, rhonchi or wheezing; respirations unlabored    Heart[de-identified]   Regular rate and rhythm; no murmur, rub, or gallop  Abdomen:   Soft, non-tender, non-distended; normal bowel sounds; no masses, no organomegaly    Genitalia:   Deferred    Rectal:   Deferred    Extremities:  No cyanosis, clubbing or edema    Pulses:  2+ and symmetric    Skin:  No jaundice, rashes, or lesions    Lymph nodes:  No palpable cervical lymphadenopathy        Lab Results:   No visits with results within 1 Day(s) from this visit     Latest known visit with results is:   Appointment on 2022   Component Date Value   • Protime 2022 12 7    • INR 2022 0 93    • WBC 2022 9 55    • RBC 2022 3 83 (L)    • Hemoglobin 2022 11 3 (L)    • Hematocrit 2022 34 6 (L)    • MCV 2022 90    • MCH 2022 29 5    • MCHC 2022 32 7    • RDW 2022 13 2    • MPV 2022 9 7    • Platelets  449 (H)    • nRBC 2022 0    • Neutrophils Relative 2022 55    • Immat GRANS % 2022 1    • Lymphocytes Relative 2022 25    • Monocytes Relative 2022 9    • Eosinophils Relative 2022 9 (H)    • Basophils Relative 2022 1    • Neutrophils Absolute 08/26/2022 5 31    • Immature Grans Absolute 08/26/2022 0 11    • Lymphocytes Absolute 08/26/2022 2 34    • Monocytes Absolute 08/26/2022 0 81    • Eosinophils Absolute 08/26/2022 0 86 (H)    • Basophils Absolute 08/26/2022 0 12 (H)    • Iron Saturation 08/26/2022 14 (L)    • TIBC 08/26/2022 342    • Iron 08/26/2022 48 (L)    • Ferritin 08/26/2022 54          Radiology Results:   No results found

## 2022-11-29 NOTE — H&P (VIEW-ONLY)
[facility] - Outpatient Follow-up Note  Ashley Aldridge 62 y o  male MRN: [id number]  Encounter: [id number]          ASSESSMENT AND PLAN:      1  Adenomatous polyp in the descending colon  2  Post polypectomy bleeding  3  Anemia secondary to post polypectomy bleeding    -I will schedule him for colonoscopy for removal of residual polyp in the descending colon  -bowel prep instructions given  -he will get blood work done to check his hemoglobin through his primary care doctor in January 2023  -we discussed risks and benefits of procedure risks include but not limited to infection, bleeding, perforation, missed lesion  Follow-up with me as needed    ______________________________________________________________________    SUBJECTIVE:  27-year-old male who was admitted in August 2022 with post polypectomy bleeding here for follow-up visit  He had colonoscopy on 8/18/2022 by Dr James Johnson  He was admitted with post polypectomy bleeding  He underwent colonoscopy which showed partially removed polyp in the descending colon and visible vessel at the site of polypectomy  Hemoclips were placed with successful hemostasis  Tattoo was placed to localize this polyp  After polypectomy, he was having change in bowel habit alternating between diarrhea and constipation  Now he is doing  Denies abdominal pain, melena or hematochezia  His last hemoglobin was 11 3 -checked 1 week after discharge    Additional 5 mm polyp was removed from transverse colon using cold snare    REVIEW OF SYSTEMS IS OTHERWISE NEGATIVE  Historical Information   History reviewed  No pertinent past medical history  History reviewed  No pertinent surgical history    Social History   Social History     Substance and Sexual Activity   Alcohol Use Not Currently    Comment: social     Social History     Substance and Sexual Activity   Drug Use Never     Social History     Tobacco Use   Smoking Status Former   • Packs/day: 0 25 • Years: 2 00   • Pack years: 0 50   • Types: Cigarettes   • Quit date: 18   • Years since quittin 5   Smokeless Tobacco Never     Family History   Problem Relation Age of Onset   • Crohn's disease Mother    • Stomach cancer Maternal Grandfather        Meds/Allergies     No current outpatient medications on file  No Known Allergies        Objective     Blood pressure 120/78, temperature 98 1 °F (36 7 °C), temperature source Tympanic, height 5' 9" (1 753 m), weight 89 4 kg (197 lb)  Body mass index is 29 09 kg/m²  PHYSICAL EXAM:      General Appearance:   Alert, cooperative, no distress   HEENT:   Normocephalic, atraumatic, anicteric      Neck:  Supple, symmetrical, trachea midline   Lungs:   Clear to auscultation bilaterally; no rales, rhonchi or wheezing; respirations unlabored    Heart[de-identified]   Regular rate and rhythm; no murmur, rub, or gallop  Abdomen:   Soft, non-tender, non-distended; normal bowel sounds; no masses, no organomegaly    Genitalia:   Deferred    Rectal:   Deferred    Extremities:  No cyanosis, clubbing or edema    Pulses:  2+ and symmetric    Skin:  No jaundice, rashes, or lesions    Lymph nodes:  No palpable cervical lymphadenopathy        Lab Results:   No visits with results within 1 Day(s) from this visit     Latest known visit with results is:   Appointment on 2022   Component Date Value   • Protime 2022 12 7    • INR 2022 0 93    • WBC 2022 9 55    • RBC 2022 3 83 (L)    • Hemoglobin 2022 11 3 (L)    • Hematocrit 2022 34 6 (L)    • MCV 2022 90    • MCH 2022 29 5    • MCHC 2022 32 7    • RDW 2022 13 2    • MPV 2022 9 7    • Platelets  449 (H)    • nRBC 2022 0    • Neutrophils Relative 2022 55    • Immat GRANS % 2022 1    • Lymphocytes Relative 2022 25    • Monocytes Relative 2022 9    • Eosinophils Relative 2022 9 (H)    • Basophils Relative 2022 1    • Neutrophils Absolute 08/26/2022 5 31    • Immature Grans Absolute 08/26/2022 0 11    • Lymphocytes Absolute 08/26/2022 2 34    • Monocytes Absolute 08/26/2022 0 81    • Eosinophils Absolute 08/26/2022 0 86 (H)    • Basophils Absolute 08/26/2022 0 12 (H)    • Iron Saturation 08/26/2022 14 (L)    • TIBC 08/26/2022 342    • Iron 08/26/2022 48 (L)    • Ferritin 08/26/2022 54          Radiology Results:   No results found

## 2022-12-16 ENCOUNTER — TELEPHONE (OUTPATIENT)
Dept: GASTROENTEROLOGY | Facility: CLINIC | Age: 57
End: 2022-12-16

## 2022-12-19 ENCOUNTER — HOSPITAL ENCOUNTER (OUTPATIENT)
Dept: GASTROENTEROLOGY | Facility: HOSPITAL | Age: 57
Setting detail: OUTPATIENT SURGERY
Discharge: HOME | End: 2022-12-19
Attending: INTERNAL MEDICINE | Admitting: INTERNAL MEDICINE

## 2022-12-19 ENCOUNTER — ANESTHESIA (OUTPATIENT)
Dept: GASTROENTEROLOGY | Facility: HOSPITAL | Age: 57
End: 2022-12-19

## 2022-12-19 ENCOUNTER — ANESTHESIA EVENT (OUTPATIENT)
Dept: GASTROENTEROLOGY | Facility: HOSPITAL | Age: 57
End: 2022-12-19

## 2022-12-19 VITALS
SYSTOLIC BLOOD PRESSURE: 95 MMHG | RESPIRATION RATE: 16 BRPM | OXYGEN SATURATION: 94 % | DIASTOLIC BLOOD PRESSURE: 60 MMHG | WEIGHT: 185 LBS | BODY MASS INDEX: 27.32 KG/M2 | TEMPERATURE: 96.8 F | HEART RATE: 77 BPM

## 2022-12-19 DIAGNOSIS — K62.5 RECTAL BLEEDING: ICD-10-CM

## 2022-12-19 RX ORDER — DIPHENHYDRAMINE HYDROCHLORIDE 50 MG/ML
12.5 INJECTION INTRAMUSCULAR; INTRAVENOUS ONCE AS NEEDED
Status: CANCELLED | OUTPATIENT
Start: 2022-12-19

## 2022-12-19 RX ORDER — SODIUM CHLORIDE 9 MG/ML
INJECTION, SOLUTION INTRAVENOUS CONTINUOUS PRN
Status: DISCONTINUED | OUTPATIENT
Start: 2022-12-19 | End: 2022-12-19

## 2022-12-19 RX ORDER — ONDANSETRON 2 MG/ML
4 INJECTION INTRAMUSCULAR; INTRAVENOUS ONCE AS NEEDED
Status: CANCELLED | OUTPATIENT
Start: 2022-12-19

## 2022-12-19 RX ORDER — METOCLOPRAMIDE HYDROCHLORIDE 5 MG/ML
10 INJECTION INTRAMUSCULAR; INTRAVENOUS ONCE AS NEEDED
Status: CANCELLED | OUTPATIENT
Start: 2022-12-19

## 2022-12-19 RX ORDER — PROPOFOL 10 MG/ML
INJECTION, EMULSION INTRAVENOUS AS NEEDED
Status: DISCONTINUED | OUTPATIENT
Start: 2022-12-19 | End: 2022-12-19

## 2022-12-19 RX ORDER — LIDOCAINE HYDROCHLORIDE 10 MG/ML
0.5 INJECTION, SOLUTION EPIDURAL; INFILTRATION; INTRACAUDAL; PERINEURAL ONCE AS NEEDED
Status: CANCELLED | OUTPATIENT
Start: 2022-12-19

## 2022-12-19 RX ADMIN — SODIUM CHLORIDE: 0.9 INJECTION, SOLUTION INTRAVENOUS at 09:17

## 2022-12-19 RX ADMIN — PROPOFOL 30 MG: 10 INJECTION, EMULSION INTRAVENOUS at 09:25

## 2022-12-19 RX ADMIN — PROPOFOL 50 MG: 10 INJECTION, EMULSION INTRAVENOUS at 09:40

## 2022-12-19 RX ADMIN — Medication 40 MG: at 09:41

## 2022-12-19 RX ADMIN — PROPOFOL 50 MG: 10 INJECTION, EMULSION INTRAVENOUS at 09:33

## 2022-12-19 RX ADMIN — PROPOFOL 50 MG: 10 INJECTION, EMULSION INTRAVENOUS at 09:27

## 2022-12-19 RX ADMIN — PROPOFOL 100 MG: 10 INJECTION, EMULSION INTRAVENOUS at 09:24

## 2022-12-19 RX ADMIN — PROPOFOL 50 MG: 10 INJECTION, EMULSION INTRAVENOUS at 09:37

## 2022-12-19 RX ADMIN — PROPOFOL 50 MG: 10 INJECTION, EMULSION INTRAVENOUS at 09:54

## 2022-12-19 RX ADMIN — PROPOFOL 50 MG: 10 INJECTION, EMULSION INTRAVENOUS at 10:05

## 2022-12-19 RX ADMIN — PROPOFOL 50 MG: 10 INJECTION, EMULSION INTRAVENOUS at 10:01

## 2022-12-19 RX ADMIN — PROPOFOL 50 MG: 10 INJECTION, EMULSION INTRAVENOUS at 09:51

## 2022-12-19 RX ADMIN — PROPOFOL 50 MG: 10 INJECTION, EMULSION INTRAVENOUS at 09:57

## 2022-12-19 RX ADMIN — SODIUM CHLORIDE: 0.9 INJECTION, SOLUTION INTRAVENOUS at 09:47

## 2022-12-19 RX ADMIN — PROPOFOL 50 MG: 10 INJECTION, EMULSION INTRAVENOUS at 09:30

## 2022-12-19 RX ADMIN — PROPOFOL 80 MG: 10 INJECTION, EMULSION INTRAVENOUS at 10:09

## 2022-12-19 RX ADMIN — PROPOFOL 50 MG: 10 INJECTION, EMULSION INTRAVENOUS at 09:47

## 2022-12-19 RX ADMIN — PROPOFOL 50 MG: 10 INJECTION, EMULSION INTRAVENOUS at 09:44

## 2022-12-19 NOTE — ANESTHESIA POSTPROCEDURE EVALUATION
Post-Op Assessment Note    CV Status:  Stable  Pain Score: 0    Pain management: adequate     Mental Status:  Alert and awake   Hydration Status:  Euvolemic   PONV Controlled:  Controlled   Airway Patency:  Patent      Post Op Vitals Reviewed: Yes      Staff: CRNA         No notable events documented      BP   92/50   Temp      Pulse  70   Resp   12   SpO2   96

## 2022-12-19 NOTE — INTERVAL H&P NOTE
H&P reviewed  After examining the patient I find no changes in the patients condition since the H&P had been written      Vitals:    12/19/22 0815   BP: 148/86   Pulse: 65   Resp: 16   Temp: 98 °F (36 7 °C)   SpO2: 95%

## 2022-12-19 NOTE — ANESTHESIA PREPROCEDURE EVALUATION
Procedure:  COLONOSCOPY    Relevant Problems   GI/HEPATIC   (+) Lower GI bleeding      /RENAL   (+) DILIA (acute kidney injury) ([facility] [address] )        Physical Exam    Airway    Mallampati score: II  TM Distance: >3 FB  Neck ROM: full     Dental   No notable dental hx     Cardiovascular  Rhythm: regular, Rate: normal, Cardiovascular exam normal    Pulmonary  Pulmonary exam normal Breath sounds clear to auscultation,     Other Findings        Anesthesia Plan  ASA Score- 2     Anesthesia Type- IV sedation with anesthesia with ASA Monitors  Additional Monitors:   Airway Plan:           Plan Factors-Exercise tolerance (METS): >4 METS  Chart reviewed  Existing labs reviewed  Patient summary reviewed  Induction- intravenous  Postoperative Plan-     Informed Consent- Anesthetic plan and risks discussed with patient  I personally reviewed this patient with the CRNA  Discussed and agreed on the Anesthesia Plan with the CRNA  Key Orosco

## 2022-12-26 ENCOUNTER — NURSE TRIAGE (OUTPATIENT)
Dept: OTHER | Facility: OTHER | Age: 57
End: 2022-12-26

## 2022-12-26 NOTE — TELEPHONE ENCOUNTER
Reason for Disposition  • MODERATE rectal bleeding (small blood clots, passing blood without stool, or toilet water turns red)    Answer Assessment - Initial Assessment Questions  1  APPEARANCE of BLOOD: "What color is it?" "Is it passed separately, on the surface of the stool, or mixed in with the stool?"       Rectal bleeding this morning   Then he had two more smaller amounts in the stool  It has not returned since 3 pm   2  AMOUNT: "How much blood was passed?"       It was enough blood in the morning  to turn the toilet   water  red  3  FREQUENCY: "How many times has blood been passed with the stools?"       Total of three times today  Blood was small in amount the last two stools  4  ONSET: "When was the blood first seen in the stools?" (Days or weeks)       This morning  5  DIARRHEA: "Is there also some diarrhea?" If Yes, ask: "How many diarrhea stools were passed in past 24 hours?"       It was loose  6  CONSTIPATION: "Do you have constipation?" If Yes, ask: "How bad is it?"      none  7  RECURRENT SYMPTOMS: "Have you had blood in your stools before?" If Yes, ask: "When was the last time?" and "What happened that time?"       He has had rectal bleeding in the past   8  BLOOD THINNERS: "Do you take any blood thinners?" (e g , Coumadin/warfarin, Pradaxa/dabigatran, aspirin)      He does not take blood thiners  9  OTHER SYMPTOMS: "Do you have any other symptoms?"  (e g , abdominal pain, vomiting, dizziness, fever)      This morning he had dizziness and mild abdominal pain  That has resolved    Had colonoscopy with clips placed 12/19/2022    Protocols used: RECTAL BLEEDING-ADULT-

## 2022-12-26 NOTE — TELEPHONE ENCOUNTER
Regarding: Colonoscopy-Post procedure Issues/ Bleeding  ----- Message from Jaci Bolanos sent at 12/26/2022  6:02 PM EST -----  "I had a colonoscopy on the 19th  This morning I woke up bleeding  Now, It seems to stop on its own   I don't know what to do, I don't want to go to the Er if I don't have to "

## 2023-02-28 PROBLEM — J30.1 CHRONIC SEASONAL ALLERGIC RHINITIS DUE TO POLLEN: Status: ACTIVE | Noted: 2023-02-28

## 2023-02-28 PROBLEM — J30.81 ALLERGIC RHINITIS DUE TO ANIMAL (CAT) (DOG) HAIR AND DANDER: Status: ACTIVE | Noted: 2023-02-28

## 2023-02-28 PROBLEM — J30.89 ALLERGIC RHINITIS DUE TO HOUSE DUST MITE: Status: ACTIVE | Noted: 2023-02-28

## 2023-04-06 ENCOUNTER — TELEPHONE (OUTPATIENT)
Dept: GASTROENTEROLOGY | Facility: CLINIC | Age: 58
End: 2023-04-06

## 2023-04-06 ENCOUNTER — PREP FOR PROCEDURE (OUTPATIENT)
Dept: GASTROENTEROLOGY | Facility: CLINIC | Age: 58
End: 2023-04-06

## 2023-04-06 DIAGNOSIS — D12.4 ADENOMATOUS POLYP OF DESCENDING COLON: Primary | ICD-10-CM

## 2023-04-06 DIAGNOSIS — Z98.890 STATUS POST COLONOSCOPY WITH POLYPECTOMY: ICD-10-CM

## 2023-04-06 NOTE — TELEPHONE ENCOUNTER
Scheduled date of colonoscopy (as of today): 6/26/23    Physician performing colonoscopy: AMELIA    Location of colonoscopy:  [facility]    Recall 6 months

## 2023-06-09 ENCOUNTER — TELEPHONE (OUTPATIENT)
Dept: GASTROENTEROLOGY | Facility: CLINIC | Age: 58
End: 2023-06-09

## 2023-06-09 NOTE — TELEPHONE ENCOUNTER
Scheduled date of colonoscopy (as of today): 8/14/2023  Physician performing colonoscopy: Dr Isabelle Nagy  [facility] of colonoscopy[facility]  Bowel prep reviewed with patient: miralax/dulcolax  Instructions reviewed with patient by: sent via [place]    Clearances:

## 2023-06-09 NOTE — TELEPHONE ENCOUNTER
"[facility] Assessment    Name: Livier Smith  YOB: 1965  Last Height: 5' 9\" (1 753 m)  Last weight: 83 9 kg (185 lb)  BMI: 27 32 kg/m²  Procedure: Colon  Diagnosis: rectal bleeding  Date of procedure: 8/14/2023  Prep:   Responsible : Delfino Love  Phone#: [phone]  Name completing form: Donel Kussmaul, MA  Date form completed: 06/09/23      If the patient answers yes to any of these questions, schedule in a hospital  Are you pregnant: No  Do you rely on a wheelchair for mobility: No  Have you been diagnosed with End Stage Renal Disease (ESRD): No  Do you need oxygen during the day: No  Have you had a heart attack or stroke within the past three months: No  Have you had a seizure within the past three months: No  Have you ever been informed by anesthesia that you have a difficult airway: No  Additional Questions  Have you had any cardiac testing or are under the care of a Cardiologist (see cardiac list): No  Cardiac list:   Do you have an implanted cardiac defibrillator: No (Comment:  This patient should be scheduled in the hospital)    Have any bleeding problems, such as anemia or hemophilia (If patient has H&H result below 8, schedule in hospital   H&H must be within 30 days of procedure): No    Had an organ transplant within the past 3 months: No    Do you have any present infections: No  Do you get short of breath when walking a few blocks: No  Have you been diagnosed with diabetes: No  Comments (provide cardiac provider information if applicable):        "

## 2023-06-17 ENCOUNTER — APPOINTMENT (OUTPATIENT)
Dept: LAB | Facility: HOSPITAL | Age: 58
End: 2023-06-17
Payer: COMMERCIAL

## 2023-06-17 DIAGNOSIS — E55.9 AVITAMINOSIS D: ICD-10-CM

## 2023-06-17 DIAGNOSIS — I51.9 MYXEDEMA HEART DISEASE: ICD-10-CM

## 2023-06-17 DIAGNOSIS — R97.20 ELEVATED PROSTATE SPECIFIC ANTIGEN (PSA): ICD-10-CM

## 2023-06-17 DIAGNOSIS — E78.00 PURE HYPERCHOLESTEROLEMIA: ICD-10-CM

## 2023-06-17 DIAGNOSIS — E03.9 MYXEDEMA HEART DISEASE: ICD-10-CM

## 2023-06-17 DIAGNOSIS — I10 ESSENTIAL HYPERTENSION, MALIGNANT: ICD-10-CM

## 2023-06-17 DIAGNOSIS — E53.8 BIOTIN-(PROPIONYL-COA-CARBOXYLASE) LIGASE DEFICIENCY: ICD-10-CM

## 2023-06-17 DIAGNOSIS — R89.1 ABNORMAL LEVEL OF HORMONES IN SPECIMENS FROM OTH ORG/TISS: ICD-10-CM

## 2023-06-17 LAB
25(OH)D3 SERPL-MCNC: 45.3 NG/ML (ref 30–100)
ALBUMIN SERPL BCP-MCNC: 4.4 G/DL (ref 3.5–5)
ALP SERPL-CCNC: 52 U/L (ref 34–104)
ALT SERPL W P-5'-P-CCNC: 19 U/L (ref 7–52)
ANION GAP SERPL CALCULATED.3IONS-SCNC: 10 MMOL/L (ref 4–13)
AST SERPL W P-5'-P-CCNC: 19 U/L (ref 13–39)
BASOPHILS # BLD AUTO: 0.05 THOUSANDS/ÂΜL (ref 0–0.1)
BASOPHILS NFR BLD AUTO: 1 % (ref 0–1)
BILIRUB SERPL-MCNC: 0.64 MG/DL (ref 0.2–1)
BUN SERPL-MCNC: 11 MG/DL (ref 5–25)
CALCIUM SERPL-MCNC: 9.7 MG/DL (ref 8.4–10.2)
CHLORIDE SERPL-SCNC: 99 MMOL/L (ref 96–108)
CHOLEST SERPL-MCNC: 238 MG/DL
CO2 SERPL-SCNC: 28 MMOL/L (ref 21–32)
CREAT SERPL-MCNC: 1.02 MG/DL (ref 0.6–1.3)
EOSINOPHIL # BLD AUTO: 0.23 THOUSAND/ÂΜL (ref 0–0.61)
EOSINOPHIL NFR BLD AUTO: 4 % (ref 0–6)
ERYTHROCYTE [DISTWIDTH] IN BLOOD BY AUTOMATED COUNT: 16 % (ref 11.6–15.1)
GFR SERPL CREATININE-BSD FRML MDRD: 81 ML/MIN/1.73SQ M
GLUCOSE P FAST SERPL-MCNC: 95 MG/DL (ref 65–99)
HCT VFR BLD AUTO: 44.9 % (ref 36.5–49.3)
HDLC SERPL-MCNC: 55 MG/DL
HGB BLD-MCNC: 14.4 G/DL (ref 12–17)
IMM GRANULOCYTES # BLD AUTO: 0.03 THOUSAND/UL (ref 0–0.2)
IMM GRANULOCYTES NFR BLD AUTO: 1 % (ref 0–2)
LDLC SERPL CALC-MCNC: 158 MG/DL (ref 0–100)
LYMPHOCYTES # BLD AUTO: 1.65 THOUSANDS/ÂΜL (ref 0.6–4.47)
LYMPHOCYTES NFR BLD AUTO: 25 % (ref 14–44)
MCH RBC QN AUTO: 27.2 PG (ref 26.8–34.3)
MCHC RBC AUTO-ENTMCNC: 32.1 G/DL (ref 31.4–37.4)
MCV RBC AUTO: 85 FL (ref 82–98)
MONOCYTES # BLD AUTO: 0.73 THOUSAND/ÂΜL (ref 0.17–1.22)
MONOCYTES NFR BLD AUTO: 11 % (ref 4–12)
NEUTROPHILS # BLD AUTO: 3.85 THOUSANDS/ÂΜL (ref 1.85–7.62)
NEUTS SEG NFR BLD AUTO: 58 % (ref 43–75)
NONHDLC SERPL-MCNC: 183 MG/DL
NRBC BLD AUTO-RTO: 0 /100 WBCS
PLATELET # BLD AUTO: 369 THOUSANDS/UL (ref 149–390)
PMV BLD AUTO: 8.9 FL (ref 8.9–12.7)
POTASSIUM SERPL-SCNC: 4.4 MMOL/L (ref 3.5–5.3)
PROT SERPL-MCNC: 7.9 G/DL (ref 6.4–8.4)
PSA SERPL-MCNC: 0.92 NG/ML (ref 0–4)
RBC # BLD AUTO: 5.3 MILLION/UL (ref 3.88–5.62)
SODIUM SERPL-SCNC: 137 MMOL/L (ref 135–147)
TRIGL SERPL-MCNC: 125 MG/DL
TSH SERPL DL<=0.05 MIU/L-ACNC: 0.86 UIU/ML (ref 0.45–4.5)
VIT B12 SERPL-MCNC: 864 PG/ML (ref 180–914)
WBC # BLD AUTO: 6.54 THOUSAND/UL (ref 4.31–10.16)

## 2023-06-17 PROCEDURE — 80053 COMPREHEN METABOLIC PANEL: CPT

## 2023-06-17 PROCEDURE — 84443 ASSAY THYROID STIM HORMONE: CPT

## 2023-06-17 PROCEDURE — 82607 VITAMIN B-12: CPT

## 2023-06-17 PROCEDURE — G0103 PSA SCREENING: HCPCS

## 2023-06-17 PROCEDURE — 80061 LIPID PANEL: CPT

## 2023-06-17 PROCEDURE — 36415 COLL VENOUS BLD VENIPUNCTURE: CPT

## 2023-06-17 PROCEDURE — 82306 VITAMIN D 25 HYDROXY: CPT

## 2023-06-17 PROCEDURE — 85025 COMPLETE CBC W/AUTO DIFF WBC: CPT

## 2023-08-05 ENCOUNTER — APPOINTMENT (OUTPATIENT)
Dept: LAB | Facility: HOSPITAL | Age: 58
End: 2023-08-05
Payer: COMMERCIAL

## 2023-08-05 DIAGNOSIS — E03.9 MYXEDEMA HEART DISEASE: ICD-10-CM

## 2023-08-05 DIAGNOSIS — E78.00 PURE HYPERCHOLESTEROLEMIA: ICD-10-CM

## 2023-08-05 DIAGNOSIS — E55.9 AVITAMINOSIS D: ICD-10-CM

## 2023-08-05 DIAGNOSIS — E53.8 BIOTIN-(PROPIONYL-COA-CARBOXYLASE) LIGASE DEFICIENCY: ICD-10-CM

## 2023-08-05 DIAGNOSIS — R97.20 ELEVATED PROSTATE SPECIFIC ANTIGEN (PSA): ICD-10-CM

## 2023-08-05 DIAGNOSIS — I51.9 MYXEDEMA HEART DISEASE: ICD-10-CM

## 2023-08-05 DIAGNOSIS — R89.1 ABNORMAL LEVEL OF HORMONES IN SPECIMENS FROM OTH ORG/TISS: ICD-10-CM

## 2023-08-05 DIAGNOSIS — I10 ESSENTIAL HYPERTENSION, MALIGNANT: ICD-10-CM

## 2023-08-05 PROCEDURE — 84402 ASSAY OF FREE TESTOSTERONE: CPT

## 2023-08-05 PROCEDURE — 84403 ASSAY OF TOTAL TESTOSTERONE: CPT

## 2023-08-05 PROCEDURE — 36415 COLL VENOUS BLD VENIPUNCTURE: CPT

## 2023-08-07 LAB
TESTOST FREE SERPL-MCNC: 7.3 PG/ML (ref 7.2–24)
TESTOST SERPL-MCNC: 455 NG/DL (ref 264–916)

## 2023-08-11 ENCOUNTER — TELEPHONE (OUTPATIENT)
Dept: GASTROENTEROLOGY | Facility: AMBULARY SURGERY CENTER | Age: 58
End: 2023-08-11

## 2023-08-14 ENCOUNTER — ANESTHESIA (OUTPATIENT)
Dept: GASTROENTEROLOGY | Facility: AMBULARY SURGERY CENTER | Age: 58
End: 2023-08-14

## 2023-08-14 ENCOUNTER — HOSPITAL ENCOUNTER (OUTPATIENT)
Dept: GASTROENTEROLOGY | Facility: AMBULARY SURGERY CENTER | Age: 58
Setting detail: OUTPATIENT SURGERY
Discharge: HOME | End: 2023-08-14
Attending: INTERNAL MEDICINE
Payer: COMMERCIAL

## 2023-08-14 ENCOUNTER — ANESTHESIA EVENT (OUTPATIENT)
Dept: GASTROENTEROLOGY | Facility: AMBULARY SURGERY CENTER | Age: 58
End: 2023-08-14

## 2023-08-14 VITALS
DIASTOLIC BLOOD PRESSURE: 105 MMHG | TEMPERATURE: 98.8 F | WEIGHT: 185 LBS | OXYGEN SATURATION: 99 % | BODY MASS INDEX: 27.4 KG/M2 | HEIGHT: 69 IN | RESPIRATION RATE: 16 BRPM | HEART RATE: 63 BPM | SYSTOLIC BLOOD PRESSURE: 149 MMHG

## 2023-08-14 DIAGNOSIS — Z98.890 STATUS POST COLONOSCOPY WITH POLYPECTOMY: ICD-10-CM

## 2023-08-14 DIAGNOSIS — D12.4 ADENOMATOUS POLYP OF DESCENDING COLON: ICD-10-CM

## 2023-08-14 PROCEDURE — 88305 TISSUE EXAM BY PATHOLOGIST: CPT | Performed by: PATHOLOGY

## 2023-08-14 PROCEDURE — 45381 COLONOSCOPY SUBMUCOUS NJX: CPT | Performed by: INTERNAL MEDICINE

## 2023-08-14 PROCEDURE — 45385 COLONOSCOPY W/LESION REMOVAL: CPT | Performed by: INTERNAL MEDICINE

## 2023-08-14 PROCEDURE — 45380 COLONOSCOPY AND BIOPSY: CPT | Performed by: INTERNAL MEDICINE

## 2023-08-14 RX ORDER — SODIUM CHLORIDE, SODIUM LACTATE, POTASSIUM CHLORIDE, CALCIUM CHLORIDE 600; 310; 30; 20 MG/100ML; MG/100ML; MG/100ML; MG/100ML
INJECTION, SOLUTION INTRAVENOUS CONTINUOUS PRN
Status: DISCONTINUED | OUTPATIENT
Start: 2023-08-14 | End: 2023-08-14

## 2023-08-14 RX ORDER — PROPOFOL 10 MG/ML
INJECTION, EMULSION INTRAVENOUS AS NEEDED
Status: DISCONTINUED | OUTPATIENT
Start: 2023-08-14 | End: 2023-08-14

## 2023-08-14 RX ORDER — PROPOFOL 10 MG/ML
INJECTION, EMULSION INTRAVENOUS CONTINUOUS PRN
Status: DISCONTINUED | OUTPATIENT
Start: 2023-08-14 | End: 2023-08-14

## 2023-08-14 RX ADMIN — PROPOFOL 100 MCG/KG/MIN: 10 INJECTION, EMULSION INTRAVENOUS at 10:31

## 2023-08-14 RX ADMIN — PROPOFOL 100 MG: 10 INJECTION, EMULSION INTRAVENOUS at 10:31

## 2023-08-14 RX ADMIN — SODIUM CHLORIDE, SODIUM LACTATE, POTASSIUM CHLORIDE, AND CALCIUM CHLORIDE: .6; .31; .03; .02 INJECTION, SOLUTION INTRAVENOUS at 10:28

## 2023-08-14 NOTE — H&P
History and Physical - [facility]  Jeniffer Bartolo IBARRA 62 y.o. male MRN: [id number]    HPI: Tim Encinas is a 62y.o. year old male who presents for surveillance colonoscopy. He had piecemeal removal of large polyp.       Review of Systems    Historical Information   Past Medical History:   Diagnosis Date   • Colon polyp    • Deviated septum    • Seasonal allergies      Past Surgical History:   Procedure Laterality Date   • COLONOSCOPY     • COLONOSCOPY W/ POLYPECTOMY  2022    and 2022   • HERNIA REPAIR Bilateral     Inguinal   • ROTATOR CUFF REPAIR Right    • SHOULDER SURGERY Right     for "shoulder separation"   • TOOTH EXTRACTION Right     2 molars and another tooth     Social History   Social History     Substance and Sexual Activity   Alcohol Use Yes   • Alcohol/week: 3.0 - 4.0 standard drinks of alcohol   • Types: 3 - 4 Cans of beer per week    Comment: social     Social History     Substance and Sexual Activity   Drug Use Never     Social History     Tobacco Use   Smoking Status Former   • Packs/day: 0.25   • Years: 2.00   • Total pack years: 0.50   • Types: Cigarettes   • Quit date:    • Years since quittin.6   Smokeless Tobacco Never     Family History   Problem Relation Age of Onset   • Crohn's disease Mother         Environmental   • Allergies Mother    • Lupus Mother    • Arthritis Mother    • Coronary artery disease Father    • Allergies Father         Environmental   • Hepatitis Father         from blood transfusion   • Allergies Sister         Environmental   • Stomach cancer Maternal Grandfather    • No Known Problems Son        Meds/Allergies     (Not in a hospital admission)      No Known Allergies    Objective     /81   Pulse 63   Temp 97.7 °F (36.5 °C) (Temporal)   Resp 16   Ht 5' 9" (1.753 m)   Wt 83.9 kg (185 lb)   SpO2 98%   BMI 27.32 kg/m²       PHYSICAL EXAM    Gen: NAD  CV: RRR  CHEST: Clear  ABD: soft, NT/ND  EXT: no edema  Neuro: AAO      ASSESSMENT/PLAN:  This is a 62y.o. year old male here for surveillance colonoscopy    PLAN:   Procedure: Colonoscopy with biopsy and possible polypectomy

## 2023-08-14 NOTE — ANESTHESIA POSTPROCEDURE EVALUATION
Post-Op Assessment Note    CV Status:  Stable  Pain Score: 0    Pain management: adequate     Mental Status:  Alert and sleepy   Hydration Status:  Euvolemic   PONV Controlled:  Controlled   Airway Patency:  Patent      Post Op Vitals Reviewed: Yes      Staff: CRNA         No notable events documented.     /84 (08/14/23 1058)    Temp 98.8 °F (37.1 °C) (08/14/23 1058)    Pulse 69 (08/14/23 1058)   Resp 16 (08/14/23 1058)    SpO2 97 % (08/14/23 1058)

## 2023-08-14 NOTE — ANESTHESIA PREPROCEDURE EVALUATION
Procedure:  COLONOSCOPY    Relevant Problems   GI/HEPATIC   (+) Lower GI bleeding      /RENAL   (+) DILIA (acute kidney injury) ([address])        Physical Exam    Airway    Mallampati score: III  TM Distance: >3 FB  Neck ROM: full     Dental       Cardiovascular  Cardiovascular exam normal    Pulmonary  Pulmonary exam normal     Other Findings      DILIA (acute kidney injury) ([address])   Adenomatous polyp of descending colon   Allergic rhinitis due to animal (cat) (dog) hair and dander   Allergic rhinitis due to house dust mite   Chronic seasonal allergic rhinitis due to pollen   Lower GI bleeding   Seasonal allergies   Status post colonoscopy with polypectomy       Anesthesia Plan  ASA Score- 2     Anesthesia Type- IV sedation with anesthesia with ASA Monitors. Additional Monitors:   Airway Plan:           Plan Factors-Exercise tolerance (METS): >4 METS. Chart reviewed. Existing labs reviewed. Patient summary reviewed. Patient is not a current smoker. Induction- intravenous. Postoperative Plan-     Informed Consent- Anesthetic plan and risks discussed with patient. I personally reviewed this patient with the CRNA. Discussed and agreed on the Anesthesia Plan with the CRNA. Constantino Pitts

## 2023-08-17 PROCEDURE — 88305 TISSUE EXAM BY PATHOLOGIST: CPT | Performed by: PATHOLOGY

## 2024-02-16 ENCOUNTER — APPOINTMENT (OUTPATIENT)
Dept: LAB | Facility: CLINIC | Age: 59
End: 2024-02-16
Payer: COMMERCIAL

## 2024-02-16 DIAGNOSIS — E03.9 MYXEDEMA HEART DISEASE: ICD-10-CM

## 2024-02-16 DIAGNOSIS — I51.9 MYXEDEMA HEART DISEASE: ICD-10-CM

## 2024-02-16 DIAGNOSIS — M25.50 PAIN IN JOINT, MULTIPLE SITES: ICD-10-CM

## 2024-02-16 DIAGNOSIS — N39.0 URINARY TRACT INFECTION WITHOUT HEMATURIA, SITE UNSPECIFIED: ICD-10-CM

## 2024-02-16 DIAGNOSIS — E78.5 HYPERLIPIDEMIA, UNSPECIFIED HYPERLIPIDEMIA TYPE: ICD-10-CM

## 2024-02-16 DIAGNOSIS — I10 ESSENTIAL HYPERTENSION, MALIGNANT: ICD-10-CM

## 2024-02-16 DIAGNOSIS — R97.20 ELEVATED PROSTATE SPECIFIC ANTIGEN (PSA): ICD-10-CM

## 2024-02-16 LAB
25(OH)D3 SERPL-MCNC: 28.7 NG/ML (ref 30–100)
ALBUMIN SERPL BCP-MCNC: 4.4 G/DL (ref 3.5–5)
ALP SERPL-CCNC: 51 U/L (ref 34–104)
ALT SERPL W P-5'-P-CCNC: 25 U/L (ref 7–52)
ANA SER QL IA: NEGATIVE
ANION GAP SERPL CALCULATED.3IONS-SCNC: 9 MMOL/L
AST SERPL W P-5'-P-CCNC: 20 U/L (ref 13–39)
BILIRUB SERPL-MCNC: 0.72 MG/DL (ref 0.2–1)
BUN SERPL-MCNC: 13 MG/DL (ref 5–25)
CALCIUM SERPL-MCNC: 8.9 MG/DL (ref 8.4–10.2)
CHLORIDE SERPL-SCNC: 102 MMOL/L (ref 96–108)
CHOLEST SERPL-MCNC: 213 MG/DL
CO2 SERPL-SCNC: 27 MMOL/L (ref 21–32)
CREAT SERPL-MCNC: 1.01 MG/DL (ref 0.6–1.3)
ERYTHROCYTE [SEDIMENTATION RATE] IN BLOOD: 15 MM/HOUR (ref 0–19)
GFR SERPL CREATININE-BSD FRML MDRD: 81 ML/MIN/1.73SQ M
GLUCOSE P FAST SERPL-MCNC: 92 MG/DL (ref 65–99)
HDLC SERPL-MCNC: 39 MG/DL
LDLC SERPL CALC-MCNC: 138 MG/DL (ref 0–100)
NONHDLC SERPL-MCNC: 174 MG/DL
POTASSIUM SERPL-SCNC: 3.9 MMOL/L (ref 3.5–5.3)
PROT SERPL-MCNC: 7.2 G/DL (ref 6.4–8.4)
PSA SERPL-MCNC: 0.8 NG/ML (ref 0–4)
SODIUM SERPL-SCNC: 138 MMOL/L (ref 135–147)
TRIGL SERPL-MCNC: 179 MG/DL
TSH SERPL DL<=0.05 MIU/L-ACNC: 1.04 UIU/ML (ref 0.45–4.5)

## 2024-02-16 PROCEDURE — 80053 COMPREHEN METABOLIC PANEL: CPT

## 2024-02-16 PROCEDURE — 84443 ASSAY THYROID STIM HORMONE: CPT

## 2024-02-16 PROCEDURE — 84153 ASSAY OF PSA TOTAL: CPT

## 2024-02-16 PROCEDURE — 86038 ANTINUCLEAR ANTIBODIES: CPT

## 2024-02-16 PROCEDURE — 81001 URINALYSIS AUTO W/SCOPE: CPT

## 2024-02-16 PROCEDURE — 85652 RBC SED RATE AUTOMATED: CPT

## 2024-02-16 PROCEDURE — 36415 COLL VENOUS BLD VENIPUNCTURE: CPT

## 2024-02-16 PROCEDURE — 82306 VITAMIN D 25 HYDROXY: CPT

## 2024-02-16 PROCEDURE — 80061 LIPID PANEL: CPT

## 2024-02-16 PROCEDURE — 86430 RHEUMATOID FACTOR TEST QUAL: CPT

## 2024-02-17 LAB — RHEUMATOID FACT SER QL LA: NEGATIVE

## 2024-02-18 LAB
BACTERIA UR QL AUTO: ABNORMAL /HPF
BILIRUB UR QL STRIP: NEGATIVE
CLARITY UR: CLEAR
COLOR UR: ABNORMAL
GLUCOSE UR STRIP-MCNC: NEGATIVE MG/DL
HGB UR QL STRIP.AUTO: NEGATIVE
KETONES UR STRIP-MCNC: NEGATIVE MG/DL
LEUKOCYTE ESTERASE UR QL STRIP: NEGATIVE
MUCOUS THREADS UR QL AUTO: ABNORMAL
NITRITE UR QL STRIP: NEGATIVE
NON-SQ EPI CELLS URNS QL MICRO: ABNORMAL /HPF
PH UR STRIP.AUTO: 6.5 [PH]
PROT UR STRIP-MCNC: NEGATIVE MG/DL
RBC #/AREA URNS AUTO: ABNORMAL /HPF
SP GR UR STRIP.AUTO: 1.02 (ref 1–1.03)
UROBILINOGEN UR STRIP-ACNC: <2 MG/DL
WBC #/AREA URNS AUTO: ABNORMAL /HPF

## 2024-03-08 ENCOUNTER — APPOINTMENT (OUTPATIENT)
Dept: LAB | Facility: CLINIC | Age: 59
End: 2024-03-08
Payer: COMMERCIAL

## 2024-03-08 DIAGNOSIS — R79.9 ABNORMAL BLOOD CHEMISTRY: ICD-10-CM

## 2024-03-08 LAB
BASOPHILS # BLD AUTO: 0.05 THOUSANDS/ÂΜL (ref 0–0.1)
BASOPHILS NFR BLD AUTO: 1 % (ref 0–1)
EOSINOPHIL # BLD AUTO: 0.34 THOUSAND/ÂΜL (ref 0–0.61)
EOSINOPHIL NFR BLD AUTO: 4 % (ref 0–6)
ERYTHROCYTE [DISTWIDTH] IN BLOOD BY AUTOMATED COUNT: 12.7 % (ref 11.6–15.1)
HCT VFR BLD AUTO: 43.8 % (ref 36.5–49.3)
HGB BLD-MCNC: 14.7 G/DL (ref 12–17)
IMM GRANULOCYTES # BLD AUTO: 0.04 THOUSAND/UL (ref 0–0.2)
IMM GRANULOCYTES NFR BLD AUTO: 1 % (ref 0–2)
LYMPHOCYTES # BLD AUTO: 2.21 THOUSANDS/ÂΜL (ref 0.6–4.47)
LYMPHOCYTES NFR BLD AUTO: 28 % (ref 14–44)
MCH RBC QN AUTO: 28.9 PG (ref 26.8–34.3)
MCHC RBC AUTO-ENTMCNC: 33.6 G/DL (ref 31.4–37.4)
MCV RBC AUTO: 86 FL (ref 82–98)
MONOCYTES # BLD AUTO: 0.94 THOUSAND/ÂΜL (ref 0.17–1.22)
MONOCYTES NFR BLD AUTO: 12 % (ref 4–12)
NEUTROPHILS # BLD AUTO: 4.34 THOUSANDS/ÂΜL (ref 1.85–7.62)
NEUTS SEG NFR BLD AUTO: 54 % (ref 43–75)
NRBC BLD AUTO-RTO: 0 /100 WBCS
PLATELET # BLD AUTO: 423 THOUSANDS/UL (ref 149–390)
PMV BLD AUTO: 10 FL (ref 8.9–12.7)
RBC # BLD AUTO: 5.08 MILLION/UL (ref 3.88–5.62)
WBC # BLD AUTO: 7.92 THOUSAND/UL (ref 4.31–10.16)

## 2024-03-08 PROCEDURE — 85025 COMPLETE CBC W/AUTO DIFF WBC: CPT

## 2024-03-08 PROCEDURE — 36415 COLL VENOUS BLD VENIPUNCTURE: CPT

## 2025-03-10 ENCOUNTER — APPOINTMENT (OUTPATIENT)
Dept: LAB | Facility: CLINIC | Age: 60
End: 2025-03-10
Payer: COMMERCIAL

## 2025-03-10 DIAGNOSIS — Z79.899 NEED FOR PROPHYLACTIC CHEMOTHERAPY: ICD-10-CM

## 2025-03-10 DIAGNOSIS — E29.1 3-OXO-5 ALPHA-STEROID DELTA 4-DEHYDROGENASE DEFICIENCY: ICD-10-CM

## 2025-03-10 DIAGNOSIS — R53.83 OTHER FATIGUE: ICD-10-CM

## 2025-03-10 DIAGNOSIS — E78.00 PURE HYPERCHOLESTEROLEMIA: ICD-10-CM

## 2025-03-10 DIAGNOSIS — E55.9 AVITAMINOSIS D: ICD-10-CM

## 2025-03-10 LAB
25(OH)D3 SERPL-MCNC: 40.2 NG/ML (ref 30–100)
ALBUMIN SERPL BCG-MCNC: 4.4 G/DL (ref 3.5–5)
ALP SERPL-CCNC: 55 U/L (ref 34–104)
ALT SERPL W P-5'-P-CCNC: 29 U/L (ref 7–52)
ANION GAP SERPL CALCULATED.3IONS-SCNC: 10 MMOL/L (ref 4–13)
AST SERPL W P-5'-P-CCNC: 23 U/L (ref 13–39)
BACTERIA UR QL AUTO: ABNORMAL /HPF
BASOPHILS # BLD AUTO: 0.05 THOUSANDS/ÂΜL (ref 0–0.1)
BASOPHILS NFR BLD AUTO: 1 % (ref 0–1)
BILIRUB SERPL-MCNC: 0.79 MG/DL (ref 0.2–1)
BILIRUB UR QL STRIP: NEGATIVE
BUN SERPL-MCNC: 18 MG/DL (ref 5–25)
CALCIUM SERPL-MCNC: 9.5 MG/DL (ref 8.4–10.2)
CHLORIDE SERPL-SCNC: 101 MMOL/L (ref 96–108)
CHOLEST SERPL-MCNC: 223 MG/DL (ref ?–200)
CLARITY UR: CLEAR
CO2 SERPL-SCNC: 27 MMOL/L (ref 21–32)
COLOR UR: YELLOW
CREAT SERPL-MCNC: 1 MG/DL (ref 0.6–1.3)
EOSINOPHIL # BLD AUTO: 0.47 THOUSAND/ÂΜL (ref 0–0.61)
EOSINOPHIL NFR BLD AUTO: 5 % (ref 0–6)
ERYTHROCYTE [DISTWIDTH] IN BLOOD BY AUTOMATED COUNT: 13.2 % (ref 11.6–15.1)
FOLATE SERPL-MCNC: >22.3 NG/ML
GFR SERPL CREATININE-BSD FRML MDRD: 82 ML/MIN/1.73SQ M
GLUCOSE P FAST SERPL-MCNC: 100 MG/DL (ref 65–99)
GLUCOSE UR STRIP-MCNC: NEGATIVE MG/DL
HCT VFR BLD AUTO: 45.3 % (ref 36.5–49.3)
HDLC SERPL-MCNC: 41 MG/DL
HGB BLD-MCNC: 14.9 G/DL (ref 12–17)
HGB UR QL STRIP.AUTO: NEGATIVE
IMM GRANULOCYTES # BLD AUTO: 0.04 THOUSAND/UL (ref 0–0.2)
IMM GRANULOCYTES NFR BLD AUTO: 1 % (ref 0–2)
KETONES UR STRIP-MCNC: NEGATIVE MG/DL
LDLC SERPL CALC-MCNC: 132 MG/DL (ref 0–100)
LEUKOCYTE ESTERASE UR QL STRIP: NEGATIVE
LYMPHOCYTES # BLD AUTO: 2.31 THOUSANDS/ÂΜL (ref 0.6–4.47)
LYMPHOCYTES NFR BLD AUTO: 26 % (ref 14–44)
MCH RBC QN AUTO: 29.3 PG (ref 26.8–34.3)
MCHC RBC AUTO-ENTMCNC: 32.9 G/DL (ref 31.4–37.4)
MCV RBC AUTO: 89 FL (ref 82–98)
MONOCYTES # BLD AUTO: 0.98 THOUSAND/ÂΜL (ref 0.17–1.22)
MONOCYTES NFR BLD AUTO: 11 % (ref 4–12)
MUCOUS THREADS UR QL AUTO: ABNORMAL
NEUTROPHILS # BLD AUTO: 5.01 THOUSANDS/ÂΜL (ref 1.85–7.62)
NEUTS SEG NFR BLD AUTO: 56 % (ref 43–75)
NITRITE UR QL STRIP: NEGATIVE
NON-SQ EPI CELLS URNS QL MICRO: ABNORMAL /HPF
NONHDLC SERPL-MCNC: 182 MG/DL
NRBC BLD AUTO-RTO: 0 /100 WBCS
PH UR STRIP.AUTO: 6 [PH]
PLATELET # BLD AUTO: 384 THOUSANDS/UL (ref 149–390)
PMV BLD AUTO: 10 FL (ref 8.9–12.7)
POTASSIUM SERPL-SCNC: 4 MMOL/L (ref 3.5–5.3)
PROT SERPL-MCNC: 7.2 G/DL (ref 6.4–8.4)
PROT UR STRIP-MCNC: ABNORMAL MG/DL
PSA SERPL-MCNC: 0.81 NG/ML (ref 0–4)
RBC # BLD AUTO: 5.08 MILLION/UL (ref 3.88–5.62)
RBC #/AREA URNS AUTO: ABNORMAL /HPF
SODIUM SERPL-SCNC: 138 MMOL/L (ref 135–147)
SP GR UR STRIP.AUTO: 1.03 (ref 1–1.03)
TESTOST SERPL-MSCNC: 362 NG/DL (ref 175–781)
TRIGL SERPL-MCNC: 248 MG/DL (ref ?–150)
TSH SERPL DL<=0.05 MIU/L-ACNC: 1.99 UIU/ML (ref 0.45–4.5)
UROBILINOGEN UR STRIP-ACNC: <2 MG/DL
VIT B12 SERPL-MCNC: 907 PG/ML (ref 180–914)
WBC # BLD AUTO: 8.86 THOUSAND/UL (ref 4.31–10.16)
WBC #/AREA URNS AUTO: ABNORMAL /HPF

## 2025-03-10 PROCEDURE — 80061 LIPID PANEL: CPT

## 2025-03-10 PROCEDURE — 80053 COMPREHEN METABOLIC PANEL: CPT

## 2025-03-10 PROCEDURE — 82607 VITAMIN B-12: CPT

## 2025-03-10 PROCEDURE — 82746 ASSAY OF FOLIC ACID SERUM: CPT

## 2025-03-10 PROCEDURE — 84443 ASSAY THYROID STIM HORMONE: CPT

## 2025-03-10 PROCEDURE — 36415 COLL VENOUS BLD VENIPUNCTURE: CPT

## 2025-03-10 PROCEDURE — 85025 COMPLETE CBC W/AUTO DIFF WBC: CPT

## 2025-03-10 PROCEDURE — 82306 VITAMIN D 25 HYDROXY: CPT

## 2025-03-10 PROCEDURE — 81001 URINALYSIS AUTO W/SCOPE: CPT

## 2025-03-10 PROCEDURE — G0103 PSA SCREENING: HCPCS

## 2025-03-10 PROCEDURE — 84403 ASSAY OF TOTAL TESTOSTERONE: CPT

## 2025-05-12 ENCOUNTER — OFFICE VISIT (OUTPATIENT)
Dept: GASTROENTEROLOGY | Facility: CLINIC | Age: 60
End: 2025-05-12

## 2025-05-12 VITALS
SYSTOLIC BLOOD PRESSURE: 140 MMHG | DIASTOLIC BLOOD PRESSURE: 82 MMHG | TEMPERATURE: 98.7 F | BODY MASS INDEX: 29.18 KG/M2 | WEIGHT: 197 LBS | HEIGHT: 69 IN

## 2025-05-12 DIAGNOSIS — K21.9 GASTROESOPHAGEAL REFLUX DISEASE, UNSPECIFIED WHETHER ESOPHAGITIS PRESENT: Primary | ICD-10-CM

## 2025-05-12 DIAGNOSIS — Z86.0101 HISTORY OF ADENOMATOUS AND SERRATED COLON POLYPS: ICD-10-CM

## 2025-05-12 DIAGNOSIS — R10.13 DYSPEPSIA: ICD-10-CM

## 2025-05-12 DIAGNOSIS — R19.4 CHANGE IN BOWEL HABIT: ICD-10-CM

## 2025-05-12 RX ORDER — VIT C/B6/B5/MAGNESIUM/HERB 173 50-5-6-5MG
CAPSULE ORAL
COMMUNITY

## 2025-05-12 RX ORDER — OMEPRAZOLE 40 MG/1
1 CAPSULE, DELAYED RELEASE ORAL DAILY
COMMUNITY
Start: 2025-03-17 | End: 2025-05-12 | Stop reason: SDUPTHER

## 2025-05-12 RX ORDER — SODIUM CHLORIDE, SODIUM LACTATE, POTASSIUM CHLORIDE, CALCIUM CHLORIDE 600; 310; 30; 20 MG/100ML; MG/100ML; MG/100ML; MG/100ML
125 INJECTION, SOLUTION INTRAVENOUS CONTINUOUS
OUTPATIENT
Start: 2025-05-12

## 2025-05-12 RX ORDER — OMEPRAZOLE 40 MG/1
40 CAPSULE, DELAYED RELEASE ORAL
Qty: 30 CAPSULE | Refills: 4 | Status: SHIPPED | OUTPATIENT
Start: 2025-05-12

## 2025-05-12 NOTE — PROGRESS NOTES
Name: Miguel A Keenan II      : 1965      MRN: [id number]  Encounter Provider: Mulu Nelson PA-C  Encounter Date: 2025   Encounter department: [facility] [place]  :  Assessment & Plan  Gastroesophageal reflux disease, unspecified whether esophagitis present  Patient with ongoing GERD and dyspepsia symptoms despite PPI.  No prior EGD.  Patient does have risk factors for Grewal's esophagus including being overweight, ongoing alcohol use, former cigarette smoking, .    I provided education on GERD and gastritis  At this time recommend patient continue with omeprazole 40 mg once daily before breakfast for now  Will order EGD with biopsy to r/o Grewal's esophagus/esophagitis/ gastritis/ H pylori/ PUD. I educated patient on GERD diet and lifestyle including avoidance of trigger foods including fatty, greasy, spicy foods, chocolate, caffeine, alcohol, citrus foods, tomato sauces. We discussed  the importance of eating smaller frequent meals, not eating close to bedtime. I also advised to limit NSAIDs if able.  Handout on GERD and GERD diet was provided in the office today.    Orders:    EGD; Future    omeprazole (PriLOSEC) 40 MG capsule; Take 1 capsule (40 mg total) by mouth daily before breakfast    Dyspepsia  As above.   Orders:    omeprazole (PriLOSEC) 40 MG capsule; Take 1 capsule (40 mg total) by mouth daily before breakfast    Change in bowel habit  Patient presenting with alternating constipation and diarrhea after a bout of gastroenteritis which has since resolved.  We discussed he is up-to-date on CRC screening.  He is due for repeat screening colonoscopy in 2026.  His weight has been stable.  No rectal bleeding.  Bowels are now normalizing.    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  Encourage patient to stay hydrated and maintain a well-rounded, healthy, high-fiber  diet  Discussed can consider trying Metamucil or Benefiber powder fiber supplement once daily (over-the-counter) dissolved in water at dinnertime to help bulk and regulate stools.    No plans for colonoscopy at this time.         History of adenomatous and serrated colon polyps  As above.        The risk/benefits/alternatives of the procedure/s were discussed with the patient.  Risks included, but not limited to, infection, bleeding, perforation, injury to organs in the abdomen, missed lesion and incomplete procedure were discussed.  Patient expressed understanding and agreeable to proceed with procedure/s.    Patient was instructed to call the office with any questions, concerns, new/ worsening/ persisting GI symptoms. Advised patient go to the ER with any severe or worsening abdominal pain, fevers/ chills, intractable N/V, chest pain, SOB, dizziness, lightheadedness, feeling something stuck in esophagus that will not go down. Patient expressed understanding and is in agreement with treatment plan.     Will plan to follow up after diagnostic tests in a few months     History of Present Illness     Miguel A Keenan II is a 59 y.o. male with a past medical history of allergies who presents to the office today for follow-up.  Patient is new to me.  Patient was last seen in the office in November 2022 by Dr. Mayo, previous office note from 11/29/2022 reviewed.  At last office visit a colonoscopy was ordered.  Since last office visit patient has undergone colonoscopy 12/19/2022, this report was reviewed, notable for multiple colon polyps and left-sided diverticulosis one of the polyps was 30 mm and flat and was removed in a piecemeal fashion.  Given the number of polyps and size of the flat polyp removed piecemeal it was recommended he undergo repeat colonoscopy in 6 months.  Most recently patient underwent colonoscopy 8/14/2023, report reviewed, notable for 4 small polyps which were removed in the colon along with a  mucosal scar at the previous 30 mm polypectomy site.  No residual polyp was noted.  The colon polyps were tubular adenomas.  It was recommended that patient undergo repeat screening colonoscopy in 3 years given personal history of colon polyps, August 2026.      Patient states that ~ 5 weeks he had a stomach virus.   He notes following this he was dealing with alternating bowel habits, diarrhea and constipation. He had used Pepto OTC as needed for diarrhea with relief and stool softener as needed for constipation with relief   Patient states more recently his Bm are normalizing. No longer with diarrhea or constipation.   Patient complains of heartburn and acid reflux x several months.   There is associated indigestion, regurgitation.  Symptoms are several days a week.  Nothing like this ever before.   He has used Omeprazole 40 mg once daily x few weeks and this only provides minimal relief.  He finds it helps the most of it takes him prior to eating a large meal or trigger food.  Patient has never had an EGD and asks about endoscopy.  Patient denies any associated current or recent fevers/ chills, unintentional weight loss, abdominal pain, nausea, vomiting, black or bloody stools, dysphagia, odynophagia.   Denies chest pain, SOB    Tobacco use-former  Alcohol use- Patient drinks beer several days week   NSAID use- None    Patient denies first-degree relative with colon cancer, inflammatory bowel disease, celiac disease     Review of Systems   Constitutional:  Negative for chills and fever.   HENT:  Negative for ear pain and sore throat.    Eyes:  Negative for pain and visual disturbance.   Respiratory:  Negative for cough and shortness of breath.    Cardiovascular:  Negative for chest pain and palpitations.   Gastrointestinal:  Positive for constipation and diarrhea. Negative for abdominal pain and vomiting.   Genitourinary:  Negative for dysuria and hematuria.   Musculoskeletal:  Negative for arthralgias and back  "pain.   Skin:  Negative for color change and rash.   Neurological:  Negative for seizures and syncope.   All other systems reviewed and are negative.    Objective   /82   Temp 98.7 °F (37.1 °C) (Oral)   Ht 5' 9\" (1.753 m)   Wt 89.4 kg (197 lb)   BMI 29.09 kg/m²      Physical Exam  Vitals reviewed.   Constitutional:       General: He is not in acute distress.     Appearance: He is well-developed. He is not ill-appearing or diaphoretic.   HENT:      Head: Normocephalic and atraumatic.      Nose: Nose normal.   Eyes:      Conjunctiva/sclera: Conjunctivae normal.   Cardiovascular:      Rate and Rhythm: Normal rate and regular rhythm.      Heart sounds: No murmur heard.  Pulmonary:      Effort: Pulmonary effort is normal. No respiratory distress.      Breath sounds: Normal breath sounds.   Abdominal:      General: Bowel sounds are normal. There is no distension.      Palpations: Abdomen is soft.      Tenderness: There is no abdominal tenderness.   Musculoskeletal:         General: No swelling or tenderness.      Cervical back: Normal range of motion and neck supple.   Skin:     General: Skin is warm and dry.      Capillary Refill: Capillary refill takes less than 2 seconds.      Coloration: Skin is not pale.   Neurological:      General: No focal deficit present.      Mental Status: He is alert and oriented to person, place, and time. Mental status is at baseline.   Psychiatric:         Mood and Affect: Mood normal.       Lab Results   Component Value Date    WBC 8.86 03/10/2025    HGB 14.9 03/10/2025    HCT 45.3 03/10/2025    MCV 89 03/10/2025     03/10/2025     Lab Results   Component Value Date    SODIUM 138 03/10/2025    K 4.0 03/10/2025     03/10/2025    CO2 27 03/10/2025    AGAP 10 03/10/2025    BUN 18 03/10/2025    CREATININE 1.00 03/10/2025    GLUC 99 08/20/2022    GLUF 100 (H) 03/10/2025    CALCIUM 9.5 03/10/2025    AST 23 03/10/2025    ALT 29 03/10/2025    ALKPHOS 55 03/10/2025    TP 7.2 " 03/10/2025    TBILI 0.79 03/10/2025    EGFR 82 03/10/2025     Lab Results   Component Value Date    OYS8EMOIQUGU 1.990 03/10/2025

## 2025-05-12 NOTE — H&P (VIEW-ONLY)
Name: Miguel A Keenan II      : 1965      MRN: [id number]  Encounter Provider: Mulu Nelson PA-C  Encounter Date: 2025   Encounter department: [facility] [place]  :  Assessment & Plan  Gastroesophageal reflux disease, unspecified whether esophagitis present  Patient with ongoing GERD and dyspepsia symptoms despite PPI.  No prior EGD.  Patient does have risk factors for Grewal's esophagus including being overweight, ongoing alcohol use, former cigarette smoking, .    I provided education on GERD and gastritis  At this time recommend patient continue with omeprazole 40 mg once daily before breakfast for now  Will order EGD with biopsy to r/o Grewal's esophagus/esophagitis/ gastritis/ H pylori/ PUD. I educated patient on GERD diet and lifestyle including avoidance of trigger foods including fatty, greasy, spicy foods, chocolate, caffeine, alcohol, citrus foods, tomato sauces. We discussed  the importance of eating smaller frequent meals, not eating close to bedtime. I also advised to limit NSAIDs if able.  Handout on GERD and GERD diet was provided in the office today.    Orders:    EGD; Future    omeprazole (PriLOSEC) 40 MG capsule; Take 1 capsule (40 mg total) by mouth daily before breakfast    Dyspepsia  As above.   Orders:    omeprazole (PriLOSEC) 40 MG capsule; Take 1 capsule (40 mg total) by mouth daily before breakfast    Change in bowel habit  Patient presenting with alternating constipation and diarrhea after a bout of gastroenteritis which has since resolved.  We discussed he is up-to-date on CRC screening.  He is due for repeat screening colonoscopy in 2026.  His weight has been stable.  No rectal bleeding.  Bowels are now normalizing.    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  Encourage patient to stay hydrated and maintain a well-rounded, healthy, high-fiber  diet  Discussed can consider trying Metamucil or Benefiber powder fiber supplement once daily (over-the-counter) dissolved in water at dinnertime to help bulk and regulate stools.    No plans for colonoscopy at this time.         History of adenomatous and serrated colon polyps  As above.        The risk/benefits/alternatives of the procedure/s were discussed with the patient.  Risks included, but not limited to, infection, bleeding, perforation, injury to organs in the abdomen, missed lesion and incomplete procedure were discussed.  Patient expressed understanding and agreeable to proceed with procedure/s.    Patient was instructed to call the office with any questions, concerns, new/ worsening/ persisting GI symptoms. Advised patient go to the ER with any severe or worsening abdominal pain, fevers/ chills, intractable N/V, chest pain, SOB, dizziness, lightheadedness, feeling something stuck in esophagus that will not go down. Patient expressed understanding and is in agreement with treatment plan.     Will plan to follow up after diagnostic tests in a few months     History of Present Illness     Miguel A Keenan II is a 59 y.o. male with a past medical history of allergies who presents to the office today for follow-up.  Patient is new to me.  Patient was last seen in the office in November 2022 by Dr. Mayo, previous office note from 11/29/2022 reviewed.  At last office visit a colonoscopy was ordered.  Since last office visit patient has undergone colonoscopy 12/19/2022, this report was reviewed, notable for multiple colon polyps and left-sided diverticulosis one of the polyps was 30 mm and flat and was removed in a piecemeal fashion.  Given the number of polyps and size of the flat polyp removed piecemeal it was recommended he undergo repeat colonoscopy in 6 months.  Most recently patient underwent colonoscopy 8/14/2023, report reviewed, notable for 4 small polyps which were removed in the colon along with a  mucosal scar at the previous 30 mm polypectomy site.  No residual polyp was noted.  The colon polyps were tubular adenomas.  It was recommended that patient undergo repeat screening colonoscopy in 3 years given personal history of colon polyps, August 2026.      Patient states that ~ 5 weeks he had a stomach virus.   He notes following this he was dealing with alternating bowel habits, diarrhea and constipation. He had used Pepto OTC as needed for diarrhea with relief and stool softener as needed for constipation with relief   Patient states more recently his Bm are normalizing. No longer with diarrhea or constipation.   Patient complains of heartburn and acid reflux x several months.   There is associated indigestion, regurgitation.  Symptoms are several days a week.  Nothing like this ever before.   He has used Omeprazole 40 mg once daily x few weeks and this only provides minimal relief.  He finds it helps the most of it takes him prior to eating a large meal or trigger food.  Patient has never had an EGD and asks about endoscopy.  Patient denies any associated current or recent fevers/ chills, unintentional weight loss, abdominal pain, nausea, vomiting, black or bloody stools, dysphagia, odynophagia.   Denies chest pain, SOB    Tobacco use-former  Alcohol use- Patient drinks beer several days week   NSAID use- None    Patient denies first-degree relative with colon cancer, inflammatory bowel disease, celiac disease     Review of Systems   Constitutional:  Negative for chills and fever.   HENT:  Negative for ear pain and sore throat.    Eyes:  Negative for pain and visual disturbance.   Respiratory:  Negative for cough and shortness of breath.    Cardiovascular:  Negative for chest pain and palpitations.   Gastrointestinal:  Positive for constipation and diarrhea. Negative for abdominal pain and vomiting.   Genitourinary:  Negative for dysuria and hematuria.   Musculoskeletal:  Negative for arthralgias and back  "pain.   Skin:  Negative for color change and rash.   Neurological:  Negative for seizures and syncope.   All other systems reviewed and are negative.    Objective   /82   Temp 98.7 °F (37.1 °C) (Oral)   Ht 5' 9\" (1.753 m)   Wt 89.4 kg (197 lb)   BMI 29.09 kg/m²      Physical Exam  Vitals reviewed.   Constitutional:       General: He is not in acute distress.     Appearance: He is well-developed. He is not ill-appearing or diaphoretic.   HENT:      Head: Normocephalic and atraumatic.      Nose: Nose normal.   Eyes:      Conjunctiva/sclera: Conjunctivae normal.   Cardiovascular:      Rate and Rhythm: Normal rate and regular rhythm.      Heart sounds: No murmur heard.  Pulmonary:      Effort: Pulmonary effort is normal. No respiratory distress.      Breath sounds: Normal breath sounds.   Abdominal:      General: Bowel sounds are normal. There is no distension.      Palpations: Abdomen is soft.      Tenderness: There is no abdominal tenderness.   Musculoskeletal:         General: No swelling or tenderness.      Cervical back: Normal range of motion and neck supple.   Skin:     General: Skin is warm and dry.      Capillary Refill: Capillary refill takes less than 2 seconds.      Coloration: Skin is not pale.   Neurological:      General: No focal deficit present.      Mental Status: He is alert and oriented to person, place, and time. Mental status is at baseline.   Psychiatric:         Mood and Affect: Mood normal.       Lab Results   Component Value Date    WBC 8.86 03/10/2025    HGB 14.9 03/10/2025    HCT 45.3 03/10/2025    MCV 89 03/10/2025     03/10/2025     Lab Results   Component Value Date    SODIUM 138 03/10/2025    K 4.0 03/10/2025     03/10/2025    CO2 27 03/10/2025    AGAP 10 03/10/2025    BUN 18 03/10/2025    CREATININE 1.00 03/10/2025    GLUC 99 08/20/2022    GLUF 100 (H) 03/10/2025    CALCIUM 9.5 03/10/2025    AST 23 03/10/2025    ALT 29 03/10/2025    ALKPHOS 55 03/10/2025    TP 7.2 " 03/10/2025    TBILI 0.79 03/10/2025    EGFR 82 03/10/2025     Lab Results   Component Value Date    KLS8YYUYFHDI 1.990 03/10/2025

## 2025-05-12 NOTE — PATIENT INSTRUCTIONS
Can consider using Metamucil or Benefiber powder fiber supplement once daily (over-the-counter) dissolved in water at dinnertime to help bulk and regulate stools.    Avoid triggers foods including fatty, greasy, spicy foods, chocolate, caffeine, alcohol, citrus foods, tomato sauces    Scheduled date of EGD(as of today): 05/27/2025  Physician performing EGD: Dr. Mayo   Location of EGD:   Instructions reviewed with patient by: Ruth BENITES   Clearances:  N/A

## 2025-05-27 ENCOUNTER — ANESTHESIA EVENT (OUTPATIENT)
Dept: GASTROENTEROLOGY | Facility: HOSPITAL | Age: 60
End: 2025-05-27
Payer: COMMERCIAL

## 2025-05-27 ENCOUNTER — ANESTHESIA (OUTPATIENT)
Dept: GASTROENTEROLOGY | Facility: HOSPITAL | Age: 60
End: 2025-05-27
Payer: COMMERCIAL

## 2025-05-27 ENCOUNTER — HOSPITAL ENCOUNTER (OUTPATIENT)
Dept: GASTROENTEROLOGY | Facility: HOSPITAL | Age: 60
Setting detail: OUTPATIENT SURGERY
Discharge: HOME | End: 2025-05-27
Attending: PHYSICIAN ASSISTANT
Payer: COMMERCIAL

## 2025-05-27 VITALS
RESPIRATION RATE: 14 BRPM | OXYGEN SATURATION: 98 % | SYSTOLIC BLOOD PRESSURE: 132 MMHG | HEART RATE: 68 BPM | DIASTOLIC BLOOD PRESSURE: 68 MMHG | TEMPERATURE: 97.8 F

## 2025-05-27 DIAGNOSIS — K21.9 GASTROESOPHAGEAL REFLUX DISEASE, UNSPECIFIED WHETHER ESOPHAGITIS PRESENT: ICD-10-CM

## 2025-05-27 PROCEDURE — 88305 TISSUE EXAM BY PATHOLOGIST: CPT | Performed by: PATHOLOGY

## 2025-05-27 PROCEDURE — 43239 EGD BIOPSY SINGLE/MULTIPLE: CPT | Performed by: INTERNAL MEDICINE

## 2025-05-27 RX ORDER — SODIUM CHLORIDE, SODIUM LACTATE, POTASSIUM CHLORIDE, CALCIUM CHLORIDE 600; 310; 30; 20 MG/100ML; MG/100ML; MG/100ML; MG/100ML
125 INJECTION, SOLUTION INTRAVENOUS CONTINUOUS
Status: DISCONTINUED | OUTPATIENT
Start: 2025-05-27 | End: 2025-05-31 | Stop reason: HOSPADM

## 2025-05-27 RX ORDER — PROPOFOL 10 MG/ML
INJECTION, EMULSION INTRAVENOUS AS NEEDED
Status: DISCONTINUED | OUTPATIENT
Start: 2025-05-27 | End: 2025-05-27

## 2025-05-27 RX ORDER — LIDOCAINE HYDROCHLORIDE 10 MG/ML
INJECTION, SOLUTION EPIDURAL; INFILTRATION; INTRACAUDAL; PERINEURAL AS NEEDED
Status: DISCONTINUED | OUTPATIENT
Start: 2025-05-27 | End: 2025-05-27

## 2025-05-27 RX ADMIN — PROPOFOL 160 MG: 10 INJECTION, EMULSION INTRAVENOUS at 15:13

## 2025-05-27 RX ADMIN — LIDOCAINE HYDROCHLORIDE 40 MG: 10 INJECTION, SOLUTION EPIDURAL; INFILTRATION; INTRACAUDAL at 15:17

## 2025-05-27 RX ADMIN — PROPOFOL 160 MG: 10 INJECTION, EMULSION INTRAVENOUS at 15:17

## 2025-05-27 RX ADMIN — SODIUM CHLORIDE, SODIUM LACTATE, POTASSIUM CHLORIDE, AND CALCIUM CHLORIDE 125 ML/HR: .6; .31; .03; .02 INJECTION, SOLUTION INTRAVENOUS at 14:02

## 2025-05-27 RX ADMIN — PROPOFOL 40 MG: 10 INJECTION, EMULSION INTRAVENOUS at 15:18

## 2025-05-27 NOTE — ANESTHESIA POSTPROCEDURE EVALUATION
Post-Op Assessment Note    CV Status:  Stable    Pain management: adequate       Mental Status:  Alert and awake   Hydration Status:  Euvolemic   PONV Controlled:  Controlled   Airway Patency:  Patent     Post Op Vitals Reviewed: Yes    No anethesia notable event occurred.    Staff: CRNA           Last Filed PACU Vitals:  Vitals Value Taken Time   Temp 97.8 °F (36.6 °C) 05/27/25 15:27   Pulse     BP     Resp     SpO2

## 2025-05-27 NOTE — ANESTHESIA PREPROCEDURE EVALUATION
Procedure:  EGD    Relevant Problems   GI/HEPATIC   (+) Lower GI bleeding      /RENAL   (+) DILIA (acute kidney injury) (HCC)        Physical Exam    Airway     Mallampati score: II  TM Distance: <3 FB  Neck ROM: full  Upper bite lip test: I  Mouth opening: >= 4 cm      Cardiovascular  Cardiovascular exam normal    Dental        Pulmonary  Pulmonary exam normal     Neurological  - normal exam    Other Findings        Anesthesia Plan  ASA Score- 2     Anesthesia Type- IV sedation with anesthesia with ASA Monitors.         Additional Monitors:     Airway Plan:            Plan Factors-Exercise tolerance (METS): >4 METS.    Chart reviewed.   Existing labs reviewed.     Patient is not a current smoker. Patient not instructed to abstain from smoking on day of procedure. Patient did not smoke on day of surgery.            Induction-     Postoperative Plan- .   Monitoring Plan - Monitoring plan - standard ASA monitoring  Post Operative Pain Plan - non-opiod analgesics        Informed Consent- Anesthetic plan and risks discussed with patient.  I personally reviewed this patient with the CRNA. Discussed and agreed on the Anesthesia Plan with the CRNA..      NPO Status:  Vitals Value Taken Time   Date of last liquid 05/26/25 05/27/25 13:54   Time of last liquid 2300 05/27/25 13:54   Date of last solid 05/26/25 05/27/25 13:54   Time of last solid 2300 05/27/25 13:54

## 2025-05-27 NOTE — INTERVAL H&P NOTE
H&P reviewed. After examining the patient I find no changes in the patients condition since the H&P had been written.    Vitals:    05/27/25 1352   BP: 127/76   Pulse: 66   Resp: 16   Temp: 98.2 °F (36.8 °C)   SpO2: 97%

## 2025-05-30 PROCEDURE — 88305 TISSUE EXAM BY PATHOLOGIST: CPT | Performed by: PATHOLOGY

## 2025-06-02 ENCOUNTER — RESULTS FOLLOW-UP (OUTPATIENT)
Dept: GASTROENTEROLOGY | Facility: CLINIC | Age: 60
End: 2025-06-02